# Patient Record
Sex: MALE | Race: WHITE | NOT HISPANIC OR LATINO | URBAN - METROPOLITAN AREA
[De-identification: names, ages, dates, MRNs, and addresses within clinical notes are randomized per-mention and may not be internally consistent; named-entity substitution may affect disease eponyms.]

---

## 2017-02-03 ENCOUNTER — APPOINTMENT (OUTPATIENT)
Dept: URBAN - METROPOLITAN AREA CLINIC 200 | Age: 57
Setting detail: DERMATOLOGY
End: 2017-02-03

## 2017-02-03 DIAGNOSIS — L57.8 OTHER SKIN CHANGES DUE TO CHRONIC EXPOSURE TO NONIONIZING RADIATION: ICD-10-CM

## 2017-02-03 DIAGNOSIS — L21.8 OTHER SEBORRHEIC DERMATITIS: ICD-10-CM

## 2017-02-03 DIAGNOSIS — L57.0 ACTINIC KERATOSIS: ICD-10-CM

## 2017-02-03 DIAGNOSIS — B35.3 TINEA PEDIS: ICD-10-CM

## 2017-02-03 PROBLEM — Z85.828 PERSONAL HISTORY OF OTHER MALIGNANT NEOPLASM OF SKIN: Status: ACTIVE | Noted: 2017-02-03

## 2017-02-03 PROCEDURE — 99213 OFFICE O/P EST LOW 20 MIN: CPT | Mod: 25

## 2017-02-03 PROCEDURE — OTHER PRESCRIPTION: OTHER

## 2017-02-03 PROCEDURE — OTHER COUNSELING: OTHER

## 2017-02-03 PROCEDURE — OTHER LIQUID NITROGEN: OTHER

## 2017-02-03 PROCEDURE — 17003 DESTRUCT PREMALG LES 2-14: CPT

## 2017-02-03 PROCEDURE — 17000 DESTRUCT PREMALG LESION: CPT

## 2017-02-03 RX ORDER — ALCLOMETASONE DIPROPIONATE 0.5 MG/G
CREAM TOPICAL
Qty: 1 | Refills: 1 | Status: ERX

## 2017-02-03 RX ORDER — OXICONAZOLE NITRATE 10 MG/G
CREAM TOPICAL
Qty: 1 | Refills: 1 | Status: ERX

## 2017-02-03 ASSESSMENT — LOCATION DETAILED DESCRIPTION DERM
LOCATION DETAILED: RIGHT SUPERIOR FOREHEAD
LOCATION DETAILED: RIGHT CENTRAL TEMPLE
LOCATION DETAILED: LEFT INFERIOR TEMPLE
LOCATION DETAILED: RIGHT CAVUM CONCHA
LOCATION DETAILED: RIGHT SUPERIOR LATERAL FOREHEAD
LOCATION DETAILED: RIGHT CRUS OF HELIX
LOCATION DETAILED: LEFT SUPERIOR MEDIAL UPPER BACK
LOCATION DETAILED: RIGHT DORSAL 4TH TOE
LOCATION DETAILED: LEFT LATERAL MALAR CHEEK
LOCATION DETAILED: LEFT LATERAL FOREHEAD

## 2017-02-03 ASSESSMENT — LOCATION SIMPLE DESCRIPTION DERM
LOCATION SIMPLE: LEFT FOREHEAD
LOCATION SIMPLE: RIGHT TEMPLE
LOCATION SIMPLE: LEFT CHEEK
LOCATION SIMPLE: LEFT TEMPLE
LOCATION SIMPLE: RIGHT FOREHEAD
LOCATION SIMPLE: RIGHT EAR
LOCATION SIMPLE: LEFT UPPER BACK
LOCATION SIMPLE: RIGHT 4TH TOE

## 2017-02-03 ASSESSMENT — LOCATION ZONE DERM
LOCATION ZONE: EAR
LOCATION ZONE: FACE
LOCATION ZONE: TOE
LOCATION ZONE: TRUNK

## 2017-02-03 NOTE — PROCEDURE: LIQUID NITROGEN
Consent: The patient's consent was obtained including but not limited to risks of crusting, scabbing, blistering, scarring, darker or lighter pigmentary change, recurrence, incomplete removal and infection.
Post-Care Instructions: I reviewed with the patient in detail post-care instructions. Patient is to wear sunprotection, and avoid picking at any of the treated lesions. Pt may apply Vaseline to crusted or scabbing areas.
Render Post-Care Instructions In Note?: no
Detail Level: Detailed
Number Of Freeze-Thaw Cycles: 2 freeze-thaw cycles
Duration Of Freeze Thaw-Cycle (Seconds): 10

## 2017-02-23 ENCOUNTER — APPOINTMENT (OUTPATIENT)
Dept: URBAN - METROPOLITAN AREA CLINIC 200 | Age: 57
Setting detail: DERMATOLOGY
End: 2017-03-01

## 2017-02-23 DIAGNOSIS — L57.0 ACTINIC KERATOSIS: ICD-10-CM

## 2017-02-23 PROCEDURE — OTHER DEFER: OTHER

## 2017-02-23 PROCEDURE — 99212 OFFICE O/P EST SF 10 MIN: CPT

## 2017-02-23 ASSESSMENT — LOCATION SIMPLE DESCRIPTION DERM: LOCATION SIMPLE: LEFT CHEEK

## 2017-02-23 ASSESSMENT — LOCATION DETAILED DESCRIPTION DERM: LOCATION DETAILED: LEFT CENTRAL MALAR CHEEK

## 2017-02-23 ASSESSMENT — LOCATION ZONE DERM: LOCATION ZONE: FACE

## 2017-02-24 ENCOUNTER — RX ONLY (RX ONLY)
Age: 57
End: 2017-02-24

## 2017-02-24 ENCOUNTER — APPOINTMENT (OUTPATIENT)
Dept: URBAN - METROPOLITAN AREA CLINIC 200 | Age: 57
Setting detail: DERMATOLOGY
End: 2017-03-01

## 2017-02-24 DIAGNOSIS — L57.0 ACTINIC KERATOSIS: ICD-10-CM

## 2017-02-24 PROCEDURE — 96567 PDT DSTR PRMLG LES SKN: CPT

## 2017-02-24 PROCEDURE — OTHER PDT: BLUE: OTHER

## 2017-02-24 PROCEDURE — OTHER PRESCRIPTION: OTHER

## 2017-02-24 RX ORDER — OXYCODONE HYDROCHLORIDE AND ACETAMINOPHEN 5; 325 MG/1; MG/1
TABLET ORAL
Qty: 16 | Refills: 0

## 2017-02-24 RX ORDER — ACETAMINOPHEN AND CODEINE PHOSPHATE 300; 30 MG/1; MG/1
TABLET ORAL
Qty: 20 | Refills: 0

## 2017-02-24 ASSESSMENT — LOCATION SIMPLE DESCRIPTION DERM
LOCATION SIMPLE: RIGHT FOREHEAD
LOCATION SIMPLE: LEFT CHEEK

## 2017-02-24 ASSESSMENT — LOCATION DETAILED DESCRIPTION DERM
LOCATION DETAILED: LEFT CENTRAL MALAR CHEEK
LOCATION DETAILED: RIGHT MEDIAL FOREHEAD

## 2017-02-24 ASSESSMENT — LOCATION ZONE DERM: LOCATION ZONE: FACE

## 2017-12-04 ENCOUNTER — RX ONLY (RX ONLY)
Age: 57
End: 2017-12-04

## 2017-12-04 RX ORDER — ECONAZOLE NITRATE 10 MG/G
CREAM TOPICAL
Qty: 1 | Refills: 3 | Status: ERX

## 2017-12-05 ENCOUNTER — RX ONLY (RX ONLY)
Age: 57
End: 2017-12-05

## 2017-12-05 RX ORDER — METHYLPREDNISOLONE 4 MG/1
TABLET ORAL
Qty: 1 | Refills: 0 | Status: ERX

## 2017-12-20 ENCOUNTER — RX ONLY (RX ONLY)
Age: 57
End: 2017-12-20

## 2017-12-20 ENCOUNTER — APPOINTMENT (OUTPATIENT)
Dept: URBAN - METROPOLITAN AREA CLINIC 200 | Age: 57
Setting detail: DERMATOLOGY
End: 2017-12-20

## 2017-12-20 DIAGNOSIS — L57.0 ACTINIC KERATOSIS: ICD-10-CM

## 2017-12-20 DIAGNOSIS — B35.3 TINEA PEDIS: ICD-10-CM

## 2017-12-20 DIAGNOSIS — L21.8 OTHER SEBORRHEIC DERMATITIS: ICD-10-CM

## 2017-12-20 DIAGNOSIS — L57.8 OTHER SKIN CHANGES DUE TO CHRONIC EXPOSURE TO NONIONIZING RADIATION: ICD-10-CM

## 2017-12-20 PROCEDURE — 99213 OFFICE O/P EST LOW 20 MIN: CPT | Mod: 25

## 2017-12-20 PROCEDURE — 17003 DESTRUCT PREMALG LES 2-14: CPT

## 2017-12-20 PROCEDURE — OTHER PRESCRIPTION: OTHER

## 2017-12-20 PROCEDURE — OTHER LIQUID NITROGEN: OTHER

## 2017-12-20 PROCEDURE — 17000 DESTRUCT PREMALG LESION: CPT

## 2017-12-20 PROCEDURE — OTHER COUNSELING: OTHER

## 2017-12-20 PROCEDURE — OTHER MIPS QUALITY: OTHER

## 2017-12-20 RX ORDER — OXICONAZOLE NITRATE 10 MG/G
CREAM TOPICAL
Qty: 1 | Refills: 6 | Status: ERX

## 2017-12-20 RX ORDER — NYSTATIN 100000 [USP'U]/G
CREAM TOPICAL
Qty: 1 | Refills: 4 | Status: ERX

## 2017-12-20 ASSESSMENT — LOCATION DETAILED DESCRIPTION DERM
LOCATION DETAILED: RIGHT SUPERIOR FOREHEAD
LOCATION DETAILED: RIGHT SUPERIOR MEDIAL UPPER BACK
LOCATION DETAILED: RIGHT INFERIOR FOREHEAD
LOCATION DETAILED: LEFT HEEL
LOCATION DETAILED: LEFT INFERIOR LATERAL FOREHEAD
LOCATION DETAILED: LEFT SUPERIOR FOREHEAD

## 2017-12-20 ASSESSMENT — LOCATION SIMPLE DESCRIPTION DERM
LOCATION SIMPLE: RIGHT UPPER BACK
LOCATION SIMPLE: RIGHT FOREHEAD
LOCATION SIMPLE: LEFT HEEL
LOCATION SIMPLE: LEFT FOREHEAD

## 2017-12-20 ASSESSMENT — PAIN INTENSITY VAS: HOW INTENSE IS YOUR PAIN 0 BEING NO PAIN, 10 BEING THE MOST SEVERE PAIN POSSIBLE?: NO PAIN

## 2017-12-20 ASSESSMENT — SEVERITY ASSESSMENT
HOW SEVERE IS THIS PATIENT'S CONDITION?: MILD
SEVERITY: MODERATE

## 2017-12-20 ASSESSMENT — LOCATION ZONE DERM
LOCATION ZONE: FACE
LOCATION ZONE: FEET
LOCATION ZONE: TRUNK

## 2017-12-20 NOTE — PROCEDURE: MIPS QUALITY
Quality 110: Preventive Care And Screening: Influenza Immunization: Influenza Immunization Administered during Influenza season
Detail Level: Generalized

## 2017-12-20 NOTE — PROCEDURE: LIQUID NITROGEN

## 2018-06-18 ENCOUNTER — APPOINTMENT (OUTPATIENT)
Dept: URBAN - METROPOLITAN AREA CLINIC 200 | Age: 58
Setting detail: DERMATOLOGY
End: 2018-06-21

## 2018-06-18 ENCOUNTER — RX ONLY (RX ONLY)
Age: 58
End: 2018-06-18

## 2018-06-18 DIAGNOSIS — L57.0 ACTINIC KERATOSIS: ICD-10-CM

## 2018-06-18 DIAGNOSIS — L21.8 OTHER SEBORRHEIC DERMATITIS: ICD-10-CM

## 2018-06-18 DIAGNOSIS — L57.8 OTHER SKIN CHANGES DUE TO CHRONIC EXPOSURE TO NONIONIZING RADIATION: ICD-10-CM

## 2018-06-18 DIAGNOSIS — B35.3 TINEA PEDIS: ICD-10-CM

## 2018-06-18 PROBLEM — Z85.828 PERSONAL HISTORY OF OTHER MALIGNANT NEOPLASM OF SKIN: Status: ACTIVE | Noted: 2018-06-18

## 2018-06-18 PROCEDURE — 17000 DESTRUCT PREMALG LESION: CPT

## 2018-06-18 PROCEDURE — 17003 DESTRUCT PREMALG LES 2-14: CPT

## 2018-06-18 PROCEDURE — OTHER PRESCRIPTION: OTHER

## 2018-06-18 PROCEDURE — OTHER PRESCRIPTION MEDICATION MANAGEMENT: OTHER

## 2018-06-18 PROCEDURE — 99213 OFFICE O/P EST LOW 20 MIN: CPT | Mod: 25

## 2018-06-18 PROCEDURE — OTHER COUNSELING: OTHER

## 2018-06-18 PROCEDURE — OTHER LIQUID NITROGEN: OTHER

## 2018-06-18 RX ORDER — OXICONAZOLE NITRATE 10 MG/G
CREAM TOPICAL
Qty: 1 | Refills: 6 | Status: ERX

## 2018-06-18 RX ORDER — NYSTATIN AND TRIAMCINOLONE ACETONIDE 100000; 1 MG/G; MG/G
CREAM TOPICAL
Qty: 1 | Refills: 3 | Status: ERX

## 2018-06-18 ASSESSMENT — LOCATION DETAILED DESCRIPTION DERM
LOCATION DETAILED: RIGHT MEDIAL TEMPLE
LOCATION DETAILED: LEFT MEDIAL SUPERIOR CHEST
LOCATION DETAILED: RIGHT INFERIOR CENTRAL MALAR CHEEK
LOCATION DETAILED: LEFT SUPERIOR FOREHEAD
LOCATION DETAILED: RIGHT INFERIOR MEDIAL FOREHEAD

## 2018-06-18 ASSESSMENT — LOCATION SIMPLE DESCRIPTION DERM
LOCATION SIMPLE: CHEST
LOCATION SIMPLE: RIGHT FOREHEAD
LOCATION SIMPLE: RIGHT CHEEK
LOCATION SIMPLE: LEFT FOREHEAD
LOCATION SIMPLE: RIGHT TEMPLE

## 2018-06-18 ASSESSMENT — LOCATION ZONE DERM
LOCATION ZONE: FACE
LOCATION ZONE: TRUNK

## 2018-06-18 ASSESSMENT — PAIN INTENSITY VAS: HOW INTENSE IS YOUR PAIN 0 BEING NO PAIN, 10 BEING THE MOST SEVERE PAIN POSSIBLE?: NO PAIN

## 2018-07-24 ENCOUNTER — RX ONLY (RX ONLY)
Age: 58
End: 2018-07-24

## 2018-07-24 RX ORDER — BETAMETHASONE DIPROPIONATE 0.5 MG/G
CREAM TOPICAL
Qty: 1 | Refills: 3 | Status: ERX | COMMUNITY
Start: 2018-07-24

## 2018-10-10 ENCOUNTER — APPOINTMENT (OUTPATIENT)
Dept: URBAN - METROPOLITAN AREA CLINIC 200 | Age: 58
Setting detail: DERMATOLOGY
End: 2018-10-15

## 2018-10-10 DIAGNOSIS — D485 NEOPLASM OF UNCERTAIN BEHAVIOR OF SKIN: ICD-10-CM

## 2018-10-10 PROBLEM — D48.5 NEOPLASM OF UNCERTAIN BEHAVIOR OF SKIN: Status: ACTIVE | Noted: 2018-10-10

## 2018-10-10 PROCEDURE — OTHER BIOPSY BY SHAVE METHOD: OTHER

## 2018-10-10 PROCEDURE — OTHER MIPS QUALITY: OTHER

## 2018-10-10 PROCEDURE — 11100: CPT

## 2018-10-10 ASSESSMENT — LOCATION SIMPLE DESCRIPTION DERM: LOCATION SIMPLE: NOSE

## 2018-10-10 ASSESSMENT — LOCATION DETAILED DESCRIPTION DERM: LOCATION DETAILED: NASAL ROOT

## 2018-10-10 ASSESSMENT — LOCATION ZONE DERM: LOCATION ZONE: NOSE

## 2018-10-10 NOTE — PROCEDURE: MIPS QUALITY
Detail Level: Detailed
Additional Notes: Patient has not received flu shot, but plans to.
Quality 110: Preventive Care And Screening: Influenza Immunization: Influenza Immunization not Administered for Documented Reasons.

## 2018-11-05 ENCOUNTER — APPOINTMENT (OUTPATIENT)
Dept: URBAN - METROPOLITAN AREA CLINIC 200 | Age: 58
Setting detail: DERMATOLOGY
End: 2018-11-08

## 2018-11-05 DIAGNOSIS — L57.0 ACTINIC KERATOSIS: ICD-10-CM

## 2018-11-05 PROCEDURE — OTHER LIQUID NITROGEN: OTHER

## 2018-11-05 PROCEDURE — 99213 OFFICE O/P EST LOW 20 MIN: CPT | Mod: 25

## 2018-11-05 PROCEDURE — 17000 DESTRUCT PREMALG LESION: CPT

## 2018-11-05 ASSESSMENT — LOCATION SIMPLE DESCRIPTION DERM: LOCATION SIMPLE: NOSE

## 2018-11-05 ASSESSMENT — LOCATION DETAILED DESCRIPTION DERM: LOCATION DETAILED: NASAL ROOT

## 2018-11-05 ASSESSMENT — LOCATION ZONE DERM: LOCATION ZONE: NOSE

## 2018-11-05 NOTE — PROCEDURE: LIQUID NITROGEN
Detail Level: Detailed
Consent: The patient's consent was obtained including but not limited to risks of crusting, scabbing, blistering, scarring, darker or lighter pigmentary change, recurrence, incomplete removal and infection.
Post-Care Instructions: I reviewed with the patient in detail post-care instructions. Patient is to wear sunprotection, and avoid picking at any of the treated lesions. Pt may apply Vaseline to crusted or scabbing areas.
Duration Of Freeze Thaw-Cycle (Seconds): 2
Render Post-Care Instructions In Note?: no
Skin normal color for race, warm, dry and intact. No evidence of rash.

## 2019-06-20 ENCOUNTER — APPOINTMENT (OUTPATIENT)
Dept: URBAN - METROPOLITAN AREA CLINIC 200 | Age: 59
Setting detail: DERMATOLOGY
End: 2019-06-23

## 2019-06-20 DIAGNOSIS — L57.8 OTHER SKIN CHANGES DUE TO CHRONIC EXPOSURE TO NONIONIZING RADIATION: ICD-10-CM

## 2019-06-20 DIAGNOSIS — L20.89 OTHER ATOPIC DERMATITIS: ICD-10-CM

## 2019-06-20 DIAGNOSIS — B35.3 TINEA PEDIS: ICD-10-CM

## 2019-06-20 DIAGNOSIS — L20.84 INTRINSIC (ALLERGIC) ECZEMA: ICD-10-CM

## 2019-06-20 DIAGNOSIS — L21.8 OTHER SEBORRHEIC DERMATITIS: ICD-10-CM

## 2019-06-20 PROCEDURE — OTHER PRESCRIPTION: OTHER

## 2019-06-20 PROCEDURE — OTHER COUNSELING: OTHER

## 2019-06-20 PROCEDURE — OTHER PRESCRIPTION SAMPLES PROVIDED: OTHER

## 2019-06-20 PROCEDURE — 99213 OFFICE O/P EST LOW 20 MIN: CPT

## 2019-06-20 RX ORDER — SULCONAZOLE NITRATE 10 MG/G
CREAM TOPICAL
Qty: 1 | Refills: 3 | Status: ERX | COMMUNITY
Start: 2019-06-20

## 2019-06-20 ASSESSMENT — LOCATION DETAILED DESCRIPTION DERM
LOCATION DETAILED: LEFT MEDIAL SUPERIOR CHEST
LOCATION DETAILED: LEFT MEDIAL PLANTAR MIDFOOT
LOCATION DETAILED: RIGHT CYMBA CONCHA

## 2019-06-20 ASSESSMENT — SEVERITY ASSESSMENT
SEVERITY: MILD TO MODERATE
HOW SEVERE IS THIS PATIENT'S CONDITION?: MODERATE

## 2019-06-20 ASSESSMENT — LOCATION ZONE DERM
LOCATION ZONE: EAR
LOCATION ZONE: FEET
LOCATION ZONE: TRUNK

## 2019-06-20 ASSESSMENT — LOCATION SIMPLE DESCRIPTION DERM
LOCATION SIMPLE: CHEST
LOCATION SIMPLE: LEFT PLANTAR SURFACE
LOCATION SIMPLE: RIGHT EAR

## 2019-10-29 ENCOUNTER — RX ONLY (RX ONLY)
Age: 59
End: 2019-10-29

## 2019-10-29 RX ORDER — TRIAMCINOLONE ACETONIDE OINTMENT 0.5 MG/G
OINTMENT TOPICAL
Qty: 1 | Refills: 0 | Status: ERX | COMMUNITY
Start: 2019-10-29

## 2020-11-03 ENCOUNTER — APPOINTMENT (OUTPATIENT)
Dept: URBAN - METROPOLITAN AREA CLINIC 200 | Age: 60
Setting detail: DERMATOLOGY
End: 2020-11-06

## 2020-11-03 DIAGNOSIS — L57.8 OTHER SKIN CHANGES DUE TO CHRONIC EXPOSURE TO NONIONIZING RADIATION: ICD-10-CM

## 2020-11-03 DIAGNOSIS — B07.8 OTHER VIRAL WARTS: ICD-10-CM

## 2020-11-03 DIAGNOSIS — L91.8 OTHER HYPERTROPHIC DISORDERS OF THE SKIN: ICD-10-CM

## 2020-11-03 DIAGNOSIS — Z85.828 PERSONAL HISTORY OF OTHER MALIGNANT NEOPLASM OF SKIN: ICD-10-CM

## 2020-11-03 DIAGNOSIS — L57.0 ACTINIC KERATOSIS: ICD-10-CM

## 2020-11-03 PROCEDURE — 99213 OFFICE O/P EST LOW 20 MIN: CPT | Mod: 25

## 2020-11-03 PROCEDURE — 17003 DESTRUCT PREMALG LES 2-14: CPT | Mod: 59

## 2020-11-03 PROCEDURE — OTHER COUNSELING: OTHER

## 2020-11-03 PROCEDURE — OTHER REASSURANCE: OTHER

## 2020-11-03 PROCEDURE — 17000 DESTRUCT PREMALG LESION: CPT | Mod: 59

## 2020-11-03 PROCEDURE — 17110 DESTRUCT B9 LESION 1-14: CPT

## 2020-11-03 PROCEDURE — OTHER LIQUID NITROGEN: OTHER

## 2020-11-03 ASSESSMENT — LOCATION SIMPLE DESCRIPTION DERM
LOCATION SIMPLE: LEFT ANTERIOR NECK
LOCATION SIMPLE: RIGHT CHEEK
LOCATION SIMPLE: LEFT THIGH
LOCATION SIMPLE: CHEST
LOCATION SIMPLE: LEFT AXILLARY VAULT
LOCATION SIMPLE: LEFT EYEBROW

## 2020-11-03 ASSESSMENT — LOCATION ZONE DERM
LOCATION ZONE: FACE
LOCATION ZONE: AXILLAE
LOCATION ZONE: TRUNK
LOCATION ZONE: NECK
LOCATION ZONE: LEG

## 2020-11-03 ASSESSMENT — LOCATION DETAILED DESCRIPTION DERM
LOCATION DETAILED: LEFT AXILLARY VAULT
LOCATION DETAILED: RIGHT CENTRAL MALAR CHEEK
LOCATION DETAILED: LEFT ANTERIOR PROXIMAL THIGH
LOCATION DETAILED: LEFT INFERIOR ANTERIOR NECK
LOCATION DETAILED: LEFT MEDIAL SUPERIOR CHEST
LOCATION DETAILED: LEFT LATERAL EYEBROW

## 2020-11-03 NOTE — PROCEDURE: LIQUID NITROGEN
Medical Necessity Clause: This procedure was medically necessary because the lesions that were treated were: causing pain
Duration Of Freeze Thaw-Cycle (Seconds): 2
Detail Level: Detailed
Include Z78.9 (Other Specified Conditions Influencing Health Status) As An Associated Diagnosis?: Yes
Consent: The patient's consent was obtained including but not limited to risks of crusting, scabbing, blistering, scarring, darker or lighter pigmentary change, recurrence, incomplete removal and infection.
Render Post-Care Instructions In Note?: no
Post-Care Instructions: I reviewed with the patient in detail post-care instructions. Patient is to wear sunprotection, and avoid picking at any of the treated lesions. Pt may apply Vaseline to crusted or scabbing areas.
Medical Necessity Information: It is in your best interest to select a reason for this procedure from the list below. All of these items fulfill various CMS LCD requirements except the new and changing color options.
Number Of Freeze-Thaw Cycles: 2 freeze-thaw cycles
Number Of Freeze-Thaw Cycles: 1 freeze-thaw cycle

## 2020-11-03 NOTE — PROCEDURE: REASSURANCE
Include Location In Plan?: Yes
Additional Note: Cryo
Hide Include Location In Plan Question?: No
Detail Level: Zone

## 2020-11-03 NOTE — HPI: EVALUATION OF SKIN LESION(S)
12/8/2018    Call Regarding [facility]ogram    Attempt 1    Message on voicemail    Patient is also due for:     Outreach   SV     Hpi Title: Evaluation of Skin Lesions

## 2020-11-04 NOTE — PROCEDURE: BIOPSY BY SHAVE METHOD
Dressing: bandage
Notification Instructions: Patient will be notified of biopsy results. However, patient instructed to call the office if not contacted within 2 weeks.
Biopsy Method: Dermablade
Post-Care Instructions: I reviewed with the patient in detail post-care instructions. Patient is to keep the biopsy site dry overnight, and then apply bacitracin twice daily until healed. Patient may apply hydrogen peroxide soaks to remove any crusting.
Type Of Destruction Used: Curettage
Render Post-Care Instructions In Note?: no
Curettage Text: The wound bed was treated with curettage after the biopsy was performed.
Hemostasis: Drysol
Wound Care: Petrolatum
X Size Of Lesion In Cm: 0
Biopsy Type: H and E
Electrodesiccation Text: The wound bed was treated with electrodesiccation after the biopsy was performed.
Cryotherapy Text: The wound bed was treated with cryotherapy after the biopsy was performed.
Was A Bandage Applied: Yes
Detail Level: Detailed
Electrodesiccation And Curettage Text: The wound bed was treated with electrodesiccation and curettage after the biopsy was performed.
Billing Type: Third-Party Bill
Anesthesia Volume In Cc (Will Not Render If 0): 0.5
Depth Of Biopsy: dermis
Silver Nitrate Text: The wound bed was treated with silver nitrate after the biopsy was performed.
Anesthesia Type: 0.5% lidocaine without epinephrine
Consent: Written consent was obtained and risks were reviewed including but not limited to scarring, infection, bleeding, scabbing, incomplete removal, nerve damage and allergy to anesthesia.
Posterior Auricular Interpolation Flap Text: A decision was made to reconstruct the defect utilizing an interpolation axial flap and a staged reconstruction.  A telfa template was made of the defect.  This telfa template was then used to outline the posterior auricular interpolation flap.  The donor area for the pedicle flap was then injected with anesthesia.  The flap was excised through the skin and subcutaneous tissue down to the layer of the underlying musculature.  The pedicle flap was carefully excised within this deep plane to maintain its blood supply.  The edges of the donor site were undermined.   The donor site was closed in a primary fashion.  The pedicle was then rotated into position and sutured.  Once the tube was sutured into place, adequate blood supply was confirmed with blanching and refill.  The pedicle was then wrapped with xeroform gauze and dressed appropriately with a telfa and gauze bandage to ensure continued blood supply and protect the attached pedicle.

## 2022-03-11 ENCOUNTER — HOSPITAL ENCOUNTER (OUTPATIENT)
Facility: CLINIC | Age: 62
Discharge: HOME | End: 2022-03-11
Attending: FAMILY MEDICINE
Payer: COMMERCIAL

## 2022-03-11 VITALS
DIASTOLIC BLOOD PRESSURE: 74 MMHG | SYSTOLIC BLOOD PRESSURE: 120 MMHG | RESPIRATION RATE: 20 BRPM | OXYGEN SATURATION: 94 % | HEART RATE: 75 BPM | TEMPERATURE: 98.9 F

## 2022-03-11 DIAGNOSIS — K52.9 GASTROENTERITIS, ACUTE: Primary | ICD-10-CM

## 2022-03-11 PROCEDURE — 99202 OFFICE O/P NEW SF 15 MIN: CPT | Performed by: FAMILY MEDICINE

## 2022-03-11 PROCEDURE — 80053 COMPREHEN METABOLIC PANEL: CPT | Performed by: FAMILY MEDICINE

## 2022-03-11 PROCEDURE — 85025 COMPLETE CBC W/AUTO DIFF WBC: CPT | Performed by: FAMILY MEDICINE

## 2022-03-11 ASSESSMENT — ENCOUNTER SYMPTOMS
BLOOD IN STOOL: 1
ABDOMINAL PAIN: 0
DIARRHEA: 1
FEVER: 0
ABDOMINAL DISTENTION: 0
DIZZINESS: 0
VOMITING: 1
FATIGUE: 0
RECTAL PAIN: 0
CHILLS: 0
NAUSEA: 1
CONSTIPATION: 0
SHORTNESS OF BREATH: 0
ANAL BLEEDING: 1

## 2022-03-12 ENCOUNTER — HOSPITAL ENCOUNTER (OUTPATIENT)
Facility: CLINIC | Age: 62
Discharge: HOME | End: 2022-03-12
Attending: FAMILY MEDICINE
Payer: COMMERCIAL

## 2022-03-12 ENCOUNTER — APPOINTMENT (EMERGENCY)
Dept: RADIOLOGY | Facility: HOSPITAL | Age: 62
End: 2022-03-12
Payer: COMMERCIAL

## 2022-03-12 ENCOUNTER — HOSPITAL ENCOUNTER (EMERGENCY)
Facility: HOSPITAL | Age: 62
Discharge: HOME | End: 2022-03-12
Attending: EMERGENCY MEDICINE
Payer: COMMERCIAL

## 2022-03-12 VITALS
WEIGHT: 215 LBS | HEIGHT: 71 IN | RESPIRATION RATE: 18 BRPM | SYSTOLIC BLOOD PRESSURE: 126 MMHG | OXYGEN SATURATION: 95 % | DIASTOLIC BLOOD PRESSURE: 64 MMHG | HEART RATE: 97 BPM | TEMPERATURE: 97.1 F | BODY MASS INDEX: 30.1 KG/M2

## 2022-03-12 VITALS
DIASTOLIC BLOOD PRESSURE: 65 MMHG | OXYGEN SATURATION: 96 % | TEMPERATURE: 98.5 F | SYSTOLIC BLOOD PRESSURE: 130 MMHG | HEART RATE: 57 BPM | RESPIRATION RATE: 18 BRPM

## 2022-03-12 DIAGNOSIS — L25.9 CONTACT DERMATITIS, UNSPECIFIED CONTACT DERMATITIS TYPE, UNSPECIFIED TRIGGER: Primary | ICD-10-CM

## 2022-03-12 DIAGNOSIS — R21 RASH: ICD-10-CM

## 2022-03-12 DIAGNOSIS — K76.0 HEPATIC STEATOSIS: Primary | ICD-10-CM

## 2022-03-12 PROBLEM — H93.13 BILATERAL TINNITUS: Status: ACTIVE | Noted: 2022-03-12

## 2022-03-12 PROBLEM — H90.2 CONDUCTIVE HEARING LOSS: Status: ACTIVE | Noted: 2022-03-12

## 2022-03-12 LAB
ALBUMIN SERPL-MCNC: 4.1 G/DL (ref 3.4–5)
ALBUMIN SERPL-MCNC: 4.1 G/DL (ref 3.4–5)
ALP SERPL-CCNC: 49 IU/L (ref 35–126)
ALP SERPL-CCNC: 64 IU/L (ref 35–126)
ALT SERPL-CCNC: 105 IU/L (ref 16–63)
ALT SERPL-CCNC: 147 IU/L (ref 16–63)
ANION GAP SERPL CALC-SCNC: 11 MEQ/L (ref 3–15)
ANION GAP SERPL CALC-SCNC: 6 MEQ/L (ref 3–15)
APAP SERPL-MCNC: <10 UG/ML (ref 10–30)
APTT PPP: 31 SEC (ref 23–35)
AST SERPL-CCNC: 45 IU/L (ref 15–41)
AST SERPL-CCNC: 81 IU/L (ref 15–41)
BACTERIA URNS QL MICRO: ABNORMAL /HPF
BASOPHILS # BLD: 0.02 K/UL (ref 0.01–0.1)
BASOPHILS # BLD: 0.05 K/UL (ref 0.01–0.1)
BASOPHILS NFR BLD: 0.3 %
BASOPHILS NFR BLD: 0.5 %
BILIRUB SERPL-MCNC: 1 MG/DL (ref 0.3–1.2)
BILIRUB SERPL-MCNC: 1 MG/DL (ref 0.3–1.2)
BILIRUB UR QL STRIP.AUTO: NEGATIVE MG/DL
BUN SERPL-MCNC: 16 MG/DL (ref 8–20)
BUN SERPL-MCNC: 20 MG/DL (ref 8–20)
CALCIUM SERPL-MCNC: 8.8 MG/DL (ref 8.9–10.3)
CALCIUM SERPL-MCNC: 9.3 MG/DL (ref 8.9–10.3)
CHLORIDE SERPL-SCNC: 104 MEQ/L (ref 98–109)
CHLORIDE SERPL-SCNC: 105 MEQ/L (ref 98–109)
CLARITY UR REFRACT.AUTO: CLEAR
CO2 SERPL-SCNC: 26 MEQ/L (ref 22–32)
CO2 SERPL-SCNC: 26 MEQ/L (ref 22–32)
COLOR UR AUTO: YELLOW
CREAT SERPL-MCNC: 0.9 MG/DL (ref 0.8–1.3)
CREAT SERPL-MCNC: 1.1 MG/DL (ref 0.8–1.3)
DIFFERENTIAL METHOD BLD: ABNORMAL
DIFFERENTIAL METHOD BLD: NORMAL
EOSINOPHIL # BLD: 0.07 K/UL (ref 0.04–0.54)
EOSINOPHIL # BLD: 0.08 K/UL (ref 0.04–0.54)
EOSINOPHIL NFR BLD: 0.6 %
EOSINOPHIL NFR BLD: 1.1 %
ERYTHROCYTE [DISTWIDTH] IN BLOOD BY AUTOMATED COUNT: 14.1 % (ref 11.6–14.4)
ERYTHROCYTE [DISTWIDTH] IN BLOOD BY AUTOMATED COUNT: 14.9 % (ref 11.6–14.4)
ETHANOL SERPL-MCNC: <5 MG/DL
GFR SERPL CREATININE-BSD FRML MDRD: >60 ML/MIN/1.73M*2
GFR SERPL CREATININE-BSD FRML MDRD: >60 ML/MIN/1.73M*2
GLUCOSE SERPL-MCNC: 100 MG/DL (ref 70–99)
GLUCOSE SERPL-MCNC: 102 MG/DL (ref 70–99)
GLUCOSE UR STRIP.AUTO-MCNC: NEGATIVE MG/DL
HCT VFR BLDCO AUTO: 44.8 % (ref 40.1–51)
HCT VFR BLDCO AUTO: 47.7 % (ref 40.1–51)
HGB BLD-MCNC: 14.6 G/DL (ref 13.7–17.5)
HGB BLD-MCNC: 15.5 G/DL (ref 13.7–17.5)
HGB UR QL STRIP.AUTO: NEGATIVE
HYALINE CASTS #/AREA URNS LPF: ABNORMAL /LPF
IMM GRANULOCYTES # BLD AUTO: 0.02 K/UL (ref 0–0.08)
IMM GRANULOCYTES # BLD AUTO: 0.02 K/UL (ref 0–0.08)
IMM GRANULOCYTES NFR BLD AUTO: 0.2 %
IMM GRANULOCYTES NFR BLD AUTO: 0.3 %
INR PPP: 1.1
KETONES UR STRIP.AUTO-MCNC: 1 MG/DL
LACTATE SERPL-SCNC: 1 MMOL/L (ref 0.4–2)
LEUKOCYTE ESTERASE UR QL STRIP.AUTO: NEGATIVE
LYMPHOCYTES # BLD: 1.31 K/UL (ref 1.2–3.5)
LYMPHOCYTES # BLD: 1.52 K/UL (ref 1.2–3.5)
LYMPHOCYTES NFR BLD: 11.9 %
LYMPHOCYTES NFR BLD: 20.6 %
MCH RBC QN AUTO: 31.1 PG (ref 28–33.2)
MCH RBC QN AUTO: 31.3 PG (ref 28–33.2)
MCHC RBC AUTO-ENTMCNC: 32.5 G/DL (ref 32.2–36.5)
MCHC RBC AUTO-ENTMCNC: 32.6 G/DL (ref 32.2–36.5)
MCV RBC AUTO: 95.3 FL (ref 83–98)
MCV RBC AUTO: 96.2 FL (ref 83–98)
MONOCYTES # BLD: 0.88 K/UL (ref 0.3–1)
MONOCYTES # BLD: 0.94 K/UL (ref 0.3–1)
MONOCYTES NFR BLD: 11.9 %
MONOCYTES NFR BLD: 8.5 %
MUCOUS THREADS URNS QL MICRO: 3 /LPF
NEUTROPHILS # BLD: 4.86 K/UL (ref 1.7–7)
NEUTROPHILS # BLD: 8.61 K/UL (ref 1.7–7)
NEUTS SEG NFR BLD: 65.8 %
NEUTS SEG NFR BLD: 78.3 %
NITRITE UR QL STRIP.AUTO: NEGATIVE
NRBC BLD-RTO: 0 %
NRBC BLD-RTO: 0 %
PDW BLD AUTO: 10.5 FL (ref 9.4–12.4)
PDW BLD AUTO: 11.4 FL (ref 9.4–12.4)
PH UR STRIP.AUTO: 6.5 [PH]
PLATELET # BLD AUTO: 152 K/UL (ref 150–350)
PLATELET # BLD AUTO: 170 K/UL (ref 150–350)
POTASSIUM SERPL-SCNC: 4.1 MEQ/L (ref 3.6–5.1)
POTASSIUM SERPL-SCNC: 4.3 MEQ/L (ref 3.6–5.1)
PROT SERPL-MCNC: 6.4 G/DL (ref 6–8.2)
PROT SERPL-MCNC: 6.8 G/DL (ref 6–8.2)
PROT UR QL STRIP.AUTO: 1
PROTHROMBIN TIME: 13.6 SEC (ref 12.2–14.5)
RBC # BLD AUTO: 4.7 M/UL (ref 4.5–5.8)
RBC # BLD AUTO: 4.96 M/UL (ref 4.5–5.8)
RBC #/AREA URNS HPF: ABNORMAL /HPF
SALICYLATES SERPL-MCNC: <4 MG/DL
SODIUM SERPL-SCNC: 136 MEQ/L (ref 136–144)
SODIUM SERPL-SCNC: 142 MEQ/L (ref 136–144)
SP GR UR REFRACT.AUTO: 1.03
SQUAMOUS URNS QL MICRO: ABNORMAL /HPF
UROBILINOGEN UR STRIP-ACNC: 0.2 EU/DL
WBC # BLD AUTO: 11 K/UL (ref 3.8–10.5)
WBC # BLD AUTO: 7.38 K/UL (ref 3.8–10.5)
WBC #/AREA URNS HPF: ABNORMAL /HPF

## 2022-03-12 PROCEDURE — 85610 PROTHROMBIN TIME: CPT | Performed by: EMERGENCY MEDICINE

## 2022-03-12 PROCEDURE — 85730 THROMBOPLASTIN TIME PARTIAL: CPT | Performed by: EMERGENCY MEDICINE

## 2022-03-12 PROCEDURE — 83605 ASSAY OF LACTIC ACID: CPT | Performed by: EMERGENCY MEDICINE

## 2022-03-12 PROCEDURE — 99213 OFFICE O/P EST LOW 20 MIN: CPT

## 2022-03-12 PROCEDURE — 81001 URINALYSIS AUTO W/SCOPE: CPT | Mod: 59

## 2022-03-12 PROCEDURE — 85025 COMPLETE CBC W/AUTO DIFF WBC: CPT

## 2022-03-12 PROCEDURE — G0480 DRUG TEST DEF 1-7 CLASSES: HCPCS

## 2022-03-12 PROCEDURE — 76705 ECHO EXAM OF ABDOMEN: CPT

## 2022-03-12 PROCEDURE — 80053 COMPREHEN METABOLIC PANEL: CPT

## 2022-03-12 PROCEDURE — 99284 EMERGENCY DEPT VISIT MOD MDM: CPT | Mod: 25

## 2022-03-12 PROCEDURE — 36415 COLL VENOUS BLD VENIPUNCTURE: CPT | Performed by: EMERGENCY MEDICINE

## 2022-03-12 RX ORDER — TRIAMCINOLONE ACETONIDE 1 MG/G
CREAM TOPICAL 2 TIMES DAILY
Qty: 15 G | Refills: 0 | Status: SHIPPED | OUTPATIENT
Start: 2022-03-12 | End: 2023-11-08 | Stop reason: ALTCHOICE

## 2022-03-12 ASSESSMENT — ENCOUNTER SYMPTOMS
COLOR CHANGE: 1
RHINORRHEA: 0
NAUSEA: 1
VOMITING: 1
COUGH: 1
HEMATURIA: 0
DIAPHORESIS: 1
SORE THROAT: 0
DYSURIA: 0
ABDOMINAL PAIN: 1
FEVER: 0
COLOR CHANGE: 0
FREQUENCY: 0
LIGHT-HEADEDNESS: 0
PALPITATIONS: 0
SHORTNESS OF BREATH: 0
NECK PAIN: 0
CONSTIPATION: 0
BACK PAIN: 0
CHILLS: 1
BLOOD IN STOOL: 1
DIARRHEA: 1
HEADACHES: 0

## 2022-03-12 NOTE — ED PROVIDER NOTES
Emergency Medicine Note  HPI   HISTORY OF PRESENT ILLNESS     63 yo male   Presents with a b/l forearm irritation that began after swimming and working out at the gym today  Denies itchiness or pain.  Denies new detergents or soaps  Was seen at this  yesterday, dx Gastroenteritis; states sxs fully improved.  .                  Patient History   PAST HISTORY     Reviewed from Nursing Triage:  Tobacco  Allergies  Meds  Problems  Med Hx  Surg Hx  Fam Hx  Soc   Hx      History reviewed. No pertinent past medical history.    History reviewed. No pertinent surgical history.    History reviewed. No pertinent family history.    Social History     Tobacco Use   • Smoking status: Not on file   • Smokeless tobacco: Not on file   Substance Use Topics   • Alcohol use: Not on file   • Drug use: Not on file         Review of Systems   REVIEW OF SYSTEMS     Review of Systems   Skin: Positive for color change and rash.   All other systems reviewed and are negative.        VITALS     ED Vitals    Date/Time Temp Pulse Resp BP SpO2 [facility]   03/12/22 1046 36.2 °C (97.1 °F) 97 18 126/64 95 % THERESA                       Physical Exam   PHYSICAL EXAM     Physical Exam  Vitals reviewed.   Constitutional:       Appearance: Normal appearance.   HENT:      Head: Normocephalic.   Cardiovascular:      Rate and Rhythm: Normal rate.      Pulses: Normal pulses.   Skin:     General: Skin is warm.      Findings: Erythema present.          Neurological:      Mental Status: He is alert.   Psychiatric:         Mood and Affect: Mood normal.         Behavior: Behavior normal.           PROCEDURES     Procedures     DATA     Results     None              No orders to display       Scoring tools                                 ED Course & MDM   MDM / ED COURSE / CLINICAL IMPRESSIONS / DISPO     MDM  Number of Diagnoses or Management Options  Contact dermatitis, unspecified contact dermatitis type, unspecified trigger: new and requires workup  Diagnosis  management comments:   Start with OTC cortisone cream for a few days  Prescription Triamcinolone given if no relief   Claritin / Pepcid may also be helpful  Avoid pool chemicals until rash is resolved  Follow up with PCP or dermatology if symptoms worsen / do not improve        Clinical Impressions as of 03/12/22 1126   Contact dermatitis, unspecified contact dermatitis type, unspecified trigger     Discharge         Benjamín Cole CRNP  03/12/22 1126

## 2022-03-12 NOTE — ED PROVIDER NOTES
"Emergency Medicine Note  HPI   HISTORY OF PRESENT ILLNESS       History provided by:  Patient and medical records   used: No      63 y/o M with no known PMH presents to the ED via private vehicle for evaluation of abnormal labs. 2 days ago, patient ate pizza and had a couple beers. Throughout that night, he had chills, sweats, nausea, vomiting, and diarrhea. He felt non-localized abdominal pain that felt like \"the pain you feel when you need to have a bowel movement.\" He noticed bright red blood in his stool and on the toilet paper when he wiped. This occurred throughout the night into the next morning. He went to urgent care yesterday and was diagnosed with gastroenteritis. He was encouraged to follow a clear liquid diet, which he has done. Patient has not had any episodes of vomiting, diarrhea, or abdominal pain since yesterday morning. Today, the patient noticed a red rash on his bilateral forearms after swimming and going to the gym. No pain, itching, swelling, or increased warmth. He went to urgent care again and was diagnosed with contact dermatitis. After leaving urgent care, patient was notified of abnormal lab results from his visit yesterday. His WBC was elevated to 11, AST was elevated to 81, and ALT to 147. Patient denies any abdominal pain at this time. He follows with his PCP annually and has not had any abnormalities prior. Denies tylenol intake. Drinks socially about 2-3 beers occasionally. No previous abdominal imaging or surgeries. He has also noticed a recent dry cough. Patient's wife notices redness to the patient's back. Denies fever, chest pain, shortness of breath, leg swelling, palpitations, back pain, urinary symptoms, lightheadedness, headache, syncope.      Patient History   PAST HISTORY     Reviewed from Nursing Triage:       History reviewed. No pertinent past medical history.    History reviewed. No pertinent surgical history.    History reviewed. No pertinent family " history.    Social History     Tobacco Use   • Smoking status: Never Smoker   • Smokeless tobacco: Never Used   Substance Use Topics   • Alcohol use: Yes     Comment: social   • Drug use: Never         Review of Systems   REVIEW OF SYSTEMS     Review of Systems   Constitutional: Positive for chills and diaphoresis. Negative for fever.   HENT: Negative for congestion, rhinorrhea and sore throat.    Respiratory: Positive for cough. Negative for shortness of breath.    Cardiovascular: Negative for chest pain, palpitations and leg swelling.   Gastrointestinal: Positive for abdominal pain, blood in stool, diarrhea, nausea and vomiting. Negative for constipation.   Genitourinary: Negative for dysuria, frequency and hematuria.   Musculoskeletal: Negative for back pain and neck pain.   Skin: Positive for rash. Negative for color change.   Neurological: Negative for syncope, light-headedness and headaches.         VITALS     ED Vitals    Date/Time Temp Pulse Resp BP SpO2 [facility]   03/12/22 2006 -- -- 18 130/65 -- JLG   03/12/22 2005 -- 57 18 130/65 96 % EEL   03/12/22 1815 36.9 °C (98.5 °F) 74 19 114/74 94 % MT        Pulse Ox %: 94 % (03/12/22 1816)  Pulse Ox Interpretation: Normal (03/12/22 1816)  Heart Rate: 74 (03/12/22 1816)        Physical Exam   PHYSICAL EXAM     Physical Exam  Vitals and nursing note reviewed. Exam conducted with a chaperone present.   Constitutional:       General: He is not in acute distress.     Appearance: Normal appearance. He is not toxic-appearing.   HENT:      Head: Normocephalic and atraumatic.      Nose: Nose normal.      Mouth/Throat:      Mouth: Mucous membranes are moist.      Pharynx: Oropharynx is clear.   Eyes:      General: No scleral icterus.     Extraocular Movements: Extraocular movements intact.      Conjunctiva/sclera: Conjunctivae normal.      Pupils: Pupils are equal, round, and reactive to light.   Cardiovascular:      Rate and Rhythm: Normal rate and regular rhythm.      Pulses:  Normal pulses.      Heart sounds: Normal heart sounds. No murmur heard.  Pulmonary:      Effort: Pulmonary effort is normal. No respiratory distress.      Breath sounds: Normal breath sounds. No wheezing, rhonchi or rales.   Abdominal:      General: Bowel sounds are normal. There is no distension.      Palpations: Abdomen is soft. There is no mass.      Tenderness: There is no abdominal tenderness. There is no right CVA tenderness, left CVA tenderness, guarding or rebound.      Hernia: No hernia is present.      Comments: No peritoneal signs   Genitourinary:     Rectum: Guaiac result positive (trace). No tenderness, anal fissure or external hemorrhoid.      Comments: No estrella blood.  Musculoskeletal:         General: Normal range of motion.      Cervical back: Normal range of motion and neck supple.      Right lower leg: No edema.      Left lower leg: No edema.   Skin:     General: Skin is warm and dry.      Capillary Refill: Capillary refill takes less than 2 seconds.      Comments: Bilateral erythematous rash on volar aspect of forearms extending from wrist to AC fossa. Circular erythematous rash to dorsal aspect of bilateral 1st metacarpals. Non-cellulitic. No increased warmth. No hives. Nontender.  Small nickel to quarter sized yellowish discolorations in 3 areas on abdomen, likely healing bruises.  Mild patchy erythema diffusely on back.   Neurological:      General: No focal deficit present.      Mental Status: He is alert and oriented to person, place, and time.      GCS: GCS eye subscore is 4. GCS verbal subscore is 5. GCS motor subscore is 6.      Sensory: Sensation is intact.      Gait: Gait is intact.      Comments: CN II-XII grossly intact   Psychiatric:         Mood and Affect: Mood normal.         Behavior: Behavior normal.           PROCEDURES     Procedures     DATA     Results     Procedure Component Value Units Date/Time    UA with reflex culture [[id number]]  (Abnormal) Collected: 03/12/22 2016     Specimen: Urine, Clean Catch Updated: 03/12/22 2032    Narrative:      The following orders were created for panel order UA with reflex culture.  Procedure                               Abnormality         Status                     ---------                               -----------         ------                     UA Reflex to Culture (Ma...[[id number]]  Abnormal            Final result               UA Microscopic[[id number]]               Abnormal            Final result                 Please view results for these tests on the individual orders.    UA Microscopic [[id number]]  (Abnormal) Collected: 03/12/22 2016    Specimen: Urine, Clean Catch Updated: 03/12/22 2032     RBC, Urine 0 TO 4 /HPF      WBC, Urine 4 TO 10 /HPF      Squamous Epithelial Rare /hpf      Hyaline Cast None Seen /lpf      Bacteria, Urine None Seen /HPF      MUCUSUA +3 /LPF     UA Reflex to Culture (Macroscopic) [[id number]]  (Abnormal) Collected: 03/12/22 2016    Specimen: Urine, Clean Catch Updated: 03/12/22 2029     Color, Urine Yellow     Clarity, Urine Clear     Specific Gravity, Urine 1.027     pH, Urine 6.5     Leukocyte Esterase Negative     Comment: Results can be falsely negative due to high specific gravity, some antibiotics, glucose >3 g/dl, or WBC other than neutrophils.        Nitrite, Urine Negative     Protein, Urine +1     Glucose, Urine Negative mg/dL      Ketones, Urine +1 mg/dL      Comment: Free sulfhydryl drugs such as Mesna, Capoten, and Acetylcysteine (Mucomyst) may cause false positive ketonuria.        Urobilinogen, Urine 0.2 EU/dL      Bilirubin, Urine Negative mg/dL      Blood, Urine Negative     Comment: The sensitivity of the occult blood test is equivalent to approximately 4 intact RBC/HPF.       Protime-INR [[id number]]  (Normal) Collected: 03/12/22 1915    Specimen: Blood, Venous Updated: 03/12/22 1954     PT 13.6 sec      INR 1.1     Comment: INR has no defined significance when PT is within Reference Range.        APTT [[id number]]  (Normal) Collected: 03/12/22 1915    Specimen: Blood, Venous Updated: 03/12/22 1954     PTT 31 sec     ER toxicology screen, serum [[id number]]  (Abnormal) Collected: 03/12/22 1916    Specimen: Blood, Venous Updated: 03/12/22 1948     Salicylate <4.0 mg/dL      Acetaminophen <10.0 ug/mL      Ethanol <5 mg/dL     Comprehensive metabolic panel [[id number]]  (Abnormal) Collected: 03/12/22 1916    Specimen: Blood, Venous Updated: 03/12/22 1947     Sodium 136 mEQ/L      Potassium 4.1 mEQ/L      Comment: Results obtained on plasma. Plasma Potassium values may be up to 0.4 mEQ/L less than serum values. The differences may be greater for patients with high platelet or white cell counts.        Chloride 104 mEQ/L      CO2 26 mEQ/L      BUN 20 mg/dL      Creatinine 0.9 mg/dL      Glucose 102 mg/dL      Calcium 8.8 mg/dL      AST (SGOT) 45 IU/L      ALT (SGPT) 105 IU/L      Alkaline Phosphatase 49 IU/L      Total Protein 6.8 g/dL      Comment: Test performed on plasma which typically contains approximately 0.4 g/dL more protein than serum.        Albumin 4.1 g/dL      Bilirubin, Total 1.0 mg/dL      eGFR >60.0 mL/min/1.73m*2      Anion Gap 6 mEQ/L     CBC and differential [[id number]] Collected: 03/12/22 1916    Specimen: Blood, Venous Updated: 03/12/22 1931     WBC 7.38 K/uL      RBC 4.70 M/uL      Hemoglobin 14.6 g/dL      Hematocrit 44.8 %      MCV 95.3 fL      MCH 31.1 pg      MCHC 32.6 g/dL      RDW 14.1 %      Platelets 152 K/uL      MPV 10.5 fL      Differential Type Auto     nRBC 0.0 %      Immature Granulocytes 0.3 %      Neutrophils 65.8 %      Lymphocytes 20.6 %      Monocytes 11.9 %      Eosinophils 1.1 %      Basophils 0.3 %      Immature Granulocytes, Absolute 0.02 K/uL      Neutrophils, Absolute 4.86 K/uL      Lymphocytes, Absolute 1.52 K/uL      Monocytes, Absolute 0.88 K/uL      Eosinophils, Absolute 0.08 K/uL      Basophils, Absolute 0.02 K/uL     Lactate, w/ reflex repeat if > 2.0  [[id number]]  (Normal) Collected: 03/12/22 1922    Specimen: Blood, Venous Updated: 03/12/22 1930     Lactate 1.0 mmol/L     RAINBOW LT BLUE [[id number]] Collected: 03/12/22 1915    Specimen: Blood, Venous Updated: 03/12/22 1928    RAINBOW RED [[id number]] Collected: 03/12/22 1915    Specimen: Blood, Venous Updated: 03/12/22 1928    [facility] Draw Panel [[id number]] Collected: 03/12/22 1915    Specimen: Blood, Venous Updated: 03/12/22 1928    Narrative:      The following orders were created for panel order [facility] Draw Panel.  Procedure                               Abnormality         Status                     ---------                               -----------         ------                     RAINBOW RED[[id number]]                                      In process                 RAINBOW LT BLUE[[id number]]                                  In process                 RAINBOW GOLD[[id number]]                                     In process                   Please view results for these tests on the individual orders.    MURIEL GOLD [[id number]] Collected: 03/12/22 1915    Specimen: Blood, Venous Updated: 03/12/22 1928          Imaging Results          ULTRASOUND ABDOMEN LIMITED (Final result)  Result time 03/12/22 20:32:42    Final result                 Impression:    IMPRESSION:  Hepatic steatosis.  No evidence for cholelithiasis, acute cholecystitis,  or biliary ductal  dilatation.  No hydronephrosis of the right kidney.             Narrative:      CLINICAL HISTORY:  Elevated liver enzymes, vomiting, diarrhea.    COMMENT: A limited abdominal ultrasound with attention to the right upper  quadrant is performed.  Grayscale and color Doppler imaging is utilized.    COMPARISON: There are no prior examinations available for comparison.    Liver:  The liver measures 14.2 cm in length. The liver is echogenic, consistent  with hepatic steatosis with multiple areas of focal fatty sparing.. No solid  hepatic lesions are seen.  There is no ascites in the upper abdomen.    Biliary tree:  There is no intra or extra hepatic biliary duct dilatation with  the common bile duct measuring 4.2 mm.    Gallbladder:  No gallstones are seen within the gallbladder. There is no  gallbladder wall thickening or pericholecystic fluid identified. A positive  sonographic Tena's sign is not elicited.  Incidentally noted is a NG and 10.    Pancreas:   The pancreatic head and body are normal.  The tail is suboptimally  visualized secondary to overlying bowel gas.    Right Kidney:  The right kidney measures 11.2 cm in length.  The kidney is  normal in echogenicity.  There is no evidence for hydronephrosis or renal  calculi.                                No orders to display       Scoring tools                                 ED Course & MDM   MDM / ED COURSE / CLINICAL IMPRESSIONS / DISPO     MDM    ED Course as of 03/12/22 2117   Sat Mar 12, 2022   1847 Impression: Abnormal WBC and elevated liver enzymes, N/V/D ~2 days ago    Plan: Labs, Guaiac, RUQ ultrasound [SG]   1918 Discussed with Dr. Haque. Adding on coags and lactate. [SG]   1957 WBC: 7.38  Improved from yesterday [SG]   1959 Comprehensive metabolic panel(!)  LFTs elevated but improved from yesterday [SG]   2033 Guaiac trace positive. No estrella blood. [SG]   2042 ULTRASOUND ABDOMEN LIMITED  Hepatic steatosis.  No evidence for cholelithiasis, acute cholecystitis,  or biliary ductal dilatation.  No hydronephrosis of the right kidney. [SG]   2107 Results reviewed with patient. Education on healthy diet choices given. Patient and family verbalized understanding. Discharge instructions reviewed with patient. Verbalized understanding. All questions answered. Signature obtained. IV removed, bleeding controlled, dressing placed.  [SG]      ED Course User Index  [SG] Earline Chan PA C         Clinical Impressions as of 03/12/22 2117   Hepatic steatosis   Rash     Discharge         Earline Chan PA C  03/12/22  [id number]

## 2022-03-12 NOTE — DISCHARGE INSTRUCTIONS
As discussed, please call your PCP Dr. Chowdary today to inform them of your ER visit and arrange a follow-up appointment within the next 2-3 days. Please return to the ED for any worsening, new, or concerning symptoms such as fever, chest pain, shortness of breath, worsening abdominal pain, persistent vomiting, increase in blood in stool, worsening rash, etc.    Please follow-up with Gastroenterology.  If you do not have one, we have provided you with one. Just call the office and ask for the earliest available appointment.    You may continue to use OTC steroid cream on your rash.

## 2022-03-12 NOTE — ED PROVIDER NOTES
"History  No chief complaint on file.    Attended the [employer] game yesterday, had 2-3 beers, pizza from 7-10:30 and a cupcake at home.   He laid down to go to bed and felt pain in his stomach, which led to loose stool that progressed to watery stool and vomiting.   He was up every 30-45min until early this AM with diarrhea and vomiting.   He woke up this AM, when he attempts to pass gas, he instead he passes \"mucousy blood\" covering a good portion of the toilet paper, \"it looked like a (smaller, [id number]) placenta.\"  The last time he passed mucousy stool was approx 1hr ago.   Pain has improved, last episode of vomiting was approx 17hrs ago.   Diet is now blackberrys, grapes, chicken noodle, crackers and water with electrolyte.   Colonoscopy at 50yo, overdue due to COVID.       History provided by:  Patient   used: No        No past medical history on file.    No past surgical history on file.    No family history on file.    Social History     Tobacco Use   • Smoking status: Not on file   • Smokeless tobacco: Not on file   Substance Use Topics   • Alcohol use: Not on file   • Drug use: Not on file       Review of Systems   Constitutional: Negative for chills, fatigue and fever.   Respiratory: Negative for shortness of breath.    Gastrointestinal: Positive for anal bleeding, blood in stool, diarrhea, nausea and vomiting. Negative for abdominal distention, abdominal pain, constipation and rectal pain.   Neurological: Negative for dizziness.       Physical Exam  ED Triage Vitals [03/11/22 1904]   Temp Heart Rate Resp BP SpO2   37.2 °C (98.9 °F) 75 20 120/74 94 %      Temp Source Heart Rate Source Patient Position BP Location FiO2 (%) (Set)   Oral -- Sitting Left upper arm --       Physical Exam  Constitutional:       General: He is not in acute distress.     Appearance: Normal appearance.   HENT:      Head: Normocephalic.   Eyes:      Extraocular Movements: Extraocular movements intact.   Abdominal:     " " General: Abdomen is flat. Bowel sounds are decreased.      Palpations: Abdomen is soft.      Tenderness: There is no abdominal tenderness. There is no right CVA tenderness, left CVA tenderness, guarding or rebound. Negative signs include Tena's sign, Rovsing's sign and McBurney's sign.      Hernia: No hernia is present.   Musculoskeletal:         General: Normal range of motion.   Skin:     General: Skin is warm.      Capillary Refill: Capillary refill takes less than 2 seconds.   Neurological:      General: No focal deficit present.      Mental Status: He is alert and oriented to person, place, and time.           Procedures  Procedures    UC Course  Clinical Impressions as of 03/11/22 1947   (Final) Gastroenteritis, acute   (Under Consideration) Hematochezia       MDM  Number of Diagnoses or Management Options  Gastroenteritis, acute  Diagnosis management comments: Attended the [employer] game yesterday, had 2-3 beers, pizza from 7-10:30 and a cupcake at home.   He laid down to go to bed and felt pain in his stomach, which led to loose stool that progressed to watery stool and vomiting.   He was up every 30-45min until early this AM with diarrhea and vomiting.   He woke up this AM, when he attempts to pass gas, he instead he passes \"mucousy blood\" covering a good portion of the toilet paper, \"it looked like a (smaller, [id number]) placenta.\"  The last time he passed mucousy stool was approx 1hr ago.  Pain has improved, last episode of vomiting was approx 17hrs ago.   Diet is now blackberrys, grapes, chicken noodle, crackers and water with electrolyte.    Colonoscopy at 52yo, overdue due to COVID.   Exam shows decreased but present bowel sounds, NT/ND, no r/r/g. Otherwise, exam overall normal. Rectal exam and KUB declined.   Recommending hydration as a focus. Recommending G2 or mixing gatorade/powerade with water 50/50 for electrolytes.   Recommending liquid diet only until bleeding stops, to progress to BRAT diet as " tolerated. To d/c ASA until bleeding stops.   Handout provided with what Foods to avoid  Urged to seek emergent medical attention in the Emergency Department should symptoms persists/worsen.       Risk of Complications, Morbidity, and/or Mortality  Presenting problems: low  Diagnostic procedures: low  Management options: low    Critical Care  Total time providing critical care: < 30 minutes    Patient Progress  Patient progress: stable                 Amilcar Will DO  03/11/22 1952

## 2022-03-12 NOTE — DISCHARGE INSTRUCTIONS
Hold your Aspirin doses until your GI bleed has fully resolved.   __________________________________________________________    Staying hydrated is very important! Mix water with Gatorade 50/50 for fluid replenishment. Be sure to sip on fluids throughout the day to prevent dehydration.   You may instead wish to use an electrolyte additive to your water to replenish your lost electrolytes.    Check out Outrageous Ginger Brew for a ginger ale with REAL ginger, not just artificial flavoring (ie-Canda Dry, Schwepps, etc). Found in organic section of [employer].      Recommending liquid diet ONLY for the next few days. It is important to give your bowels a rest! Remember: INGEST NOTHING WITH RED COLORING!    After that, you may progress your diet to the BRAT diet, Bananas, Rice, Applesauce, Toast.  Keep you meals simple while your GI tract is rebounding.    Foods to avoid:   Coffee  Citrus fruits and juices  Carbonated Beverages  Chocolate    If your symptoms worsen, persistent bleeding, worsening abdominal pain, dizziness, fever, etc, we recommend seeking care in the Emergency department for further evaluation and treatment.     Please let us know about your experience today!   Your input is truly appreciated and helps Dr. Will and your team at [facility] to continue to provide a superior level of service!   Get Well Soon!

## 2022-03-19 NOTE — ED ATTESTATION NOTE
Procedures  Physical Exam  Review of Systems    3/19/473023:00 AM  I have personally seen and examined the patient.  I reviewed and agree with the PA/NP/Resident's assessment and plan of care.    My examination, assessment, and plan of care of Chano Leon is as follows:  The patient presents with 61 yo M with no PMH p/w improving vomiting/ diarrhea which began ~ 2 nights prior after attending [employer] game. Developed diarrhea, vomiting overnight and following day. Has not vomited recently and diarrhea is gradually improving. Has passed some bloody mucous today but decreased stool / diarrhea output. No abd pain. No fevers. No urinary complaints. Also developed rash on BL forearms which he noticed after getting out of pool yesterday AM. Went to urgent care yesterday and had labs drawn which showed mild LFT elevated so was referred to ED for further eval today. No SOB or CP or wheezing. Has been tolerating normal PO intake today.   Exam: vitals wnl, RRR, CTAB, no murmurs/ rales, abd s/nt/nd, see PA note for rectal exam. RODRIGUEZ equally. Trace erythema on forearms, nontender, blanching, nonwarm.  Impression/Plan: Suspect likely food poisoning, possible gastroenteritis given self limited and spontaneously improving symptoms. Doubt sepsis, liver/ biliary pathology. Possible viral illness. History / exam not suggestive of ischemic colitis, or diverticular bleed.Possible contact dermatitis. Do not suspect cellulitis at present time. Will recheck labs, RUQ u/s. IF workup wnl, will recommend close f/u with PCP, strict return precautions for new/ worsening symptoms.     Vital Signs Review: Vital signs have been reviewed. The oxygen saturation is  SpO2: 94 % which is  wnl.    I was physically present for the key/critical portions of the following procedures: None    This document was created using dragon dictation software.  There might be some typographical errors due to this technology.    We are in a pandemic with increased  volumes and decreased capacity.      Marquis Haque MD  03/19/22 1104

## 2022-05-23 NOTE — PROGRESS NOTES
Cardiology  Office Consult Note         Reason for visit:   Chief Complaint   Patient presents with   • Cardiac Evaluation       HPI     Chano Leon is a 62 y.o. male who presents to the office for cardiovascular evaluation.    He is self referred due to family history of premature coronary artery disease. His brother Christiano who is 58 years old and my patient has dyslipidemia and was recently found to have CAD and had CABG on 22 at [facility].    He denies any chest pain, shortness of breath, edema, palpitations, near syncope or syncope.    He does aqua therapy for an hour 2 to 3 days per week and walks 45 minutes 3 to 4 days per week without an CV complaints.       Outside records reviewed..    Past Medical History:   Diagnosis Date   • Exercise-induced tachycardia    • Fatty liver    • Gastroenteritis    • Lipid disorder        Past Surgical History:   Procedure Laterality Date   • HAND SURGERY Right    • WISDOM TOOTH EXTRACTION          Social History     Tobacco Use   Smoking Status Never Smoker   Smokeless Tobacco Never Used     Social History     Tobacco Use   • Smoking status: Never Smoker   • Smokeless tobacco: Never Used   Substance Use Topics   • Alcohol use: Yes     Comment: social   • Drug use: Never       Family History   Problem Relation Age of Onset   • Lung disease Biological Mother    • Heart disease Biological Father         CABG in his 50s;  at age 70   • COPD Biological Father    • Heart disease Biological Brother         CABG at age 58   • Hyperlipidemia Biological Brother    • Hypertension Biological Brother    • No Known Problems Biological Sister    • Cancer Maternal Grandmother          in her mid 60s   • Heart attack Maternal Grandfather         multiple heart attacks starting at age 50   • Heart disease Maternal Grandfather         CHF -  in his 70s   • No Known Problems Biological Brother    • Hypertension Biological Brother    • Diabetes Biological Brother    •  Autoimmune disease Biological Sister    • Crohn's disease Biological Sister    • Autoimmune disease Biological Brother    • No Known Problems Biological Sister        Allergies:  Patient has no known allergies.    Current Outpatient Medications   Medication Sig Dispense Refill   • aspirin (ASPIR-81 ORAL)      • FISH OIL-omega-3-vit C-vit E (COROMEGA) 2,000-650-12 mg/2.5 gram emulsion in packet Take by mouth daily.     • multivitamin tablet Take by mouth daily.     • triamcinolone (KENALOG) 0.1 % cream Apply topically 2 (two) times a day for 7 days. 15 g 0   • NOT IN DATABASE daily. leveLDL -     • rosuvastatin (CRESTOR) 20 mg tablet Take 1 tablet (20 mg total) by mouth daily. 90 tablet 3     No current facility-administered medications for this visit.       Review of Systems   Constitutional: Negative for malaise/fatigue.   Cardiovascular: Negative for chest pain, dyspnea on exertion, irregular heartbeat, leg swelling, near-syncope, orthopnea, palpitations and syncope.   Hematologic/Lymphatic: Does not bruise/bleed easily.   Neurological: Negative for dizziness and light-headedness.   All other systems reviewed and are negative.      Objective     Vitals:    05/25/22 1323   BP: (!) 132/92   Pulse: 63   SpO2: 98%       Wt Readings from Last 1 Encounters:   06/23/22 97.1 kg (214 lb)       Body mass index is 29.85 kg/m².    Physical Exam  Vitals reviewed.   Constitutional:       General: He is not in acute distress.     Appearance: Normal appearance.   HENT:      Head: Normocephalic.   Eyes:      Extraocular Movements: Extraocular movements intact.   Cardiovascular:      Rate and Rhythm: Normal rate and regular rhythm.      Pulses: Normal pulses.      Heart sounds: Normal heart sounds.   Pulmonary:      Effort: Pulmonary effort is normal. No respiratory distress.      Breath sounds: Normal breath sounds. No wheezing or rales.   Abdominal:      General: There is no distension.      Palpations: Abdomen is soft.    Musculoskeletal:         General: Normal range of motion.      Cervical back: Normal range of motion.      Right lower leg: No edema.      Left lower leg: No edema.   Skin:     General: Skin is warm and dry.      Findings: No rash.   Neurological:      General: No focal deficit present.      Mental Status: He is alert and oriented to person, place, and time.   Psychiatric:         Mood and Affect: Mood normal.         Behavior: Behavior normal.         Thought Content: Thought content normal.         Judgment: Judgment normal.           ECG: Sinus bradycardia, otherwise unremarkable    Hematology  Lab Results   Component Value Date    WBC 7.38 03/12/2022    HGB 14.6 03/12/2022    HCT 44.8 03/12/2022     03/12/2022    INR 1.1 03/12/2022       Chemistries  Lab Results   Component Value Date     05/25/2022    K 4.8 05/25/2022     05/25/2022    CREATININE 1.0 05/25/2022    BUN 15 05/25/2022    CO2 27 05/25/2022    GLUCOSE 81 05/25/2022    CALCIUM 9.6 05/25/2022    ALT 40 05/25/2022    AST 27 05/25/2022       Cholesterol  Lab Results   Component Value Date    CHOL 225 (H) 05/25/2022    TRIG 102 05/25/2022    HDL 47 05/25/2022    LDLCALC 158 (H) 05/25/2022    NONHDLCALC 178 05/25/2022       Endocrine  No results found for: TSH, FREET4, HGBA1C    Assessment/Plan     Chest discomfort  The patient gets occasional episodes of slight chest discomfort.  He also gets some exertional dyspnea which is more prominent recently.  He has a strong family history of heart disease and that his brother has significant coronary disease at a relatively young age.  He has also had other relatives with coronary disease as well.  The patient has hyperlipidemia and borderline blood pressure elevation.  Because he is clearly at risk for coronary disease, and because he has symptoms which could potentially be related to coronary disease, I feel it is prudent to aggressively evaluate him.  With that in mind, I have asked him  to undergo coronary CT angiography and he is agreed.  He has no history of dye allergy.  His renal function has been normal in the recent past but we will recheck his labs along with his lipid profile.  We will discuss the results of his testing by phone and make further plans at that point.    No orders of the defined types were placed in this encounter.      There are no discontinued medications.    Orders Placed This Encounter   Procedures   • CTA CORONARY ARTERY WITH IV CONTRAST     Standing Status:   Future     Number of Occurrences:   1     Standing Expiration Date:   5/25/2023     Order Specific Question:   Release to patient     Answer:   Immediate   • Comprehensive metabolic panel     Standing Status:   Future     Number of Occurrences:   1     Standing Expiration Date:   5/25/2023     Order Specific Question:   Release to patient     Answer:   Immediate   • Lipid panel     Standing Status:   Future     Number of Occurrences:   1     Standing Expiration Date:   5/25/2023     Order Specific Question:   Release to patient     Answer:   Immediate   • ECG 12 lead     Scheduling Instructions:      PLEASE USE THIS ORDER FOR ECG'S PERFORMED IN PHYSICIAN OFFICES     Order Specific Question:   Release to patient     Answer:   Immediate       Follow Up Plans:  Return in about 4 weeks (around 6/22/2022) for Recheck, If needed.          I, Meri Sampson, am scribing for, and in the presence of, Kolton Church MD.    IKolton MD, personally performed the services described in this documentation as scribed by Meri Sampson in my presence, and it is both accurate and complete.     Kolton Church MD  8/15/2022      Addendum dated 8/15/2022:    The patient will be having debris removal from a knee within the next several days.  He had a recent coronary CT angiogram showing mild coronary disease but no significant obstructive disease.  His cardiac status is stable to undergo the planned procedure  without additional cardiovascular testing and with low to moderate and acceptable cardiovascular risk.      Kolton Church M.D.

## 2022-05-25 ENCOUNTER — OFFICE VISIT (OUTPATIENT)
Dept: CARDIOLOGY | Facility: CLINIC | Age: 62
End: 2022-05-25
Payer: COMMERCIAL

## 2022-05-25 ENCOUNTER — APPOINTMENT (OUTPATIENT)
Dept: LAB | Age: 62
End: 2022-05-25
Attending: INTERNAL MEDICINE
Payer: COMMERCIAL

## 2022-05-25 ENCOUNTER — TELEPHONE (OUTPATIENT)
Dept: CARDIOLOGY | Facility: CLINIC | Age: 62
End: 2022-05-25

## 2022-05-25 VITALS
HEIGHT: 71 IN | WEIGHT: 214 LBS | SYSTOLIC BLOOD PRESSURE: 132 MMHG | HEART RATE: 63 BPM | OXYGEN SATURATION: 98 % | BODY MASS INDEX: 29.96 KG/M2 | DIASTOLIC BLOOD PRESSURE: 92 MMHG

## 2022-05-25 DIAGNOSIS — R07.89 CHEST DISCOMFORT: Primary | ICD-10-CM

## 2022-05-25 DIAGNOSIS — E78.2 MIXED HYPERLIPIDEMIA: ICD-10-CM

## 2022-05-25 DIAGNOSIS — Z82.49 FAMILY HISTORY OF HEART DISEASE: ICD-10-CM

## 2022-05-25 DIAGNOSIS — R07.89 CHEST DISCOMFORT: ICD-10-CM

## 2022-05-25 LAB
ALBUMIN SERPL-MCNC: 4.1 G/DL (ref 3.4–5)
ALP SERPL-CCNC: 57 IU/L (ref 35–126)
ALT SERPL-CCNC: 40 IU/L (ref 16–63)
ANION GAP SERPL CALC-SCNC: 10 MEQ/L (ref 3–15)
AST SERPL-CCNC: 27 IU/L (ref 15–41)
BILIRUB SERPL-MCNC: 0.8 MG/DL (ref 0.3–1.2)
BUN SERPL-MCNC: 15 MG/DL (ref 8–20)
CALCIUM SERPL-MCNC: 9.6 MG/DL (ref 8.9–10.3)
CHLORIDE SERPL-SCNC: 105 MEQ/L (ref 98–109)
CHOLEST SERPL-MCNC: 225 MG/DL
CO2 SERPL-SCNC: 27 MEQ/L (ref 22–32)
CREAT SERPL-MCNC: 1 MG/DL (ref 0.8–1.3)
GFR SERPL CREATININE-BSD FRML MDRD: >60 ML/MIN/1.73M*2
GLUCOSE SERPL-MCNC: 81 MG/DL (ref 70–99)
HDLC SERPL-MCNC: 47 MG/DL
HDLC SERPL: 4.8 {RATIO}
LDLC SERPL CALC-MCNC: 158 MG/DL
NONHDLC SERPL-MCNC: 178 MG/DL
POTASSIUM SERPL-SCNC: 4.8 MEQ/L (ref 3.6–5.1)
PROT SERPL-MCNC: 6.1 G/DL (ref 6–8.2)
SODIUM SERPL-SCNC: 142 MEQ/L (ref 136–144)
TRIGL SERPL-MCNC: 102 MG/DL (ref 30–149)

## 2022-05-25 PROCEDURE — 36415 COLL VENOUS BLD VENIPUNCTURE: CPT

## 2022-05-25 PROCEDURE — 3008F BODY MASS INDEX DOCD: CPT | Performed by: INTERNAL MEDICINE

## 2022-05-25 PROCEDURE — 99204 OFFICE O/P NEW MOD 45 MIN: CPT | Performed by: INTERNAL MEDICINE

## 2022-05-25 PROCEDURE — 80061 LIPID PANEL: CPT

## 2022-05-25 PROCEDURE — 80053 COMPREHEN METABOLIC PANEL: CPT

## 2022-05-25 PROCEDURE — 93000 ELECTROCARDIOGRAM COMPLETE: CPT | Performed by: INTERNAL MEDICINE

## 2022-05-25 ASSESSMENT — ENCOUNTER SYMPTOMS
DYSPNEA ON EXERTION: 0
DIZZINESS: 0
ORTHOPNEA: 0
NEAR-SYNCOPE: 0
LIGHT-HEADEDNESS: 0
IRREGULAR HEARTBEAT: 0
PALPITATIONS: 0
SYNCOPE: 0
BRUISES/BLEEDS EASILY: 0

## 2022-05-25 NOTE — ASSESSMENT & PLAN NOTE
The patient gets occasional episodes of slight chest discomfort.  He also gets some exertional dyspnea which is more prominent recently.  He has a strong family history of heart disease and that his brother has significant coronary disease at a relatively young age.  He has also had other relatives with coronary disease as well.  The patient has hyperlipidemia and borderline blood pressure elevation.  Because he is clearly at risk for coronary disease, and because he has symptoms which could potentially be related to coronary disease, I feel it is prudent to aggressively evaluate him.  With that in mind, I have asked him to undergo coronary CT angiography and he is agreed.  He has no history of dye allergy.  His renal function has been normal in the recent past but we will recheck his labs along with his lipid profile.  We will discuss the results of his testing by phone and make further plans at that point.

## 2022-05-27 ENCOUNTER — TELEPHONE (OUTPATIENT)
Dept: CARDIOLOGY | Facility: CLINIC | Age: 62
End: 2022-05-27
Payer: COMMERCIAL

## 2022-05-27 NOTE — TELEPHONE ENCOUNTER
I had informed pt that he will need to wait till the contrast dye comes in. I did tell him that if the authorization expires that the office will precert the order  again.

## 2022-05-27 NOTE — TELEPHONE ENCOUNTER
I believe that the patient is safe to wait until dye becomes available for the CT scan.  The patient can contact us if he develops symptoms such as shortness of breath or chest pain which would require evaluation on a more urgent basis.

## 2022-05-27 NOTE — TELEPHONE ENCOUNTER
Patient called regarding his CT scan. He called to get scheduled and was told that because of the contrast shortage there may not be any availability until July but he is on a list. He was not sure whether he is ok to wait until July and his precert is only good until 07/10/2022. He was not sure where to go from here and asked for a call back for more information. Pt can be reached at [phone]

## 2022-06-07 ENCOUNTER — TELEPHONE (OUTPATIENT)
Dept: CARDIOLOGY | Facility: CLINIC | Age: 62
End: 2022-06-07
Payer: COMMERCIAL

## 2022-06-07 NOTE — TELEPHONE ENCOUNTER
Patient needs to have a CTA CORONARY ARTERY WITH IV CONTRAST   here at [facility]-      NPI#: [id number].       Please send back once complete.  Thanks!    .

## 2022-06-13 ENCOUNTER — TELEPHONE (OUTPATIENT)
Dept: SCHEDULING | Facility: CLINIC | Age: 62
End: 2022-06-13
Payer: COMMERCIAL

## 2022-06-13 NOTE — TELEPHONE ENCOUNTER
Pre-cert Request    Name of patient: Chano MANCILLA Carolyn    Name of physician: Kolton Church MD    Type of test: CTA CORONARY ARTERY W IV CONTRAST    Insurance: [employer]     Location having test done: [place]     NPI of location: [id number]    Scheduled/Expected date for test: expected to scheduled after July 7/15

## 2022-06-21 NOTE — TELEPHONE ENCOUNTER
Due to the shortage patient is unable to schedule test until after July 15th,  Can we please get the date extended on the precert so patient can get test completed.     If you have any questions or concerns feel free to give me a call    Central Scheduling cannot schedule test until precert is updated and date is extended..    Thank you.

## 2022-06-22 NOTE — TELEPHONE ENCOUNTER
Per Umm NORWOOD from [facility], this particular plan does not allow extensions. (Ref# [id number])    New auth will be initiated after original auth expires on 07/10/22    Added to futures file

## 2022-06-22 NOTE — TELEPHONE ENCOUNTER
Called spoke with patient advised him of the issue with the precert along with his insurance company not allowing extensions. He will call his employer to see if they can speed up the process. He will call us back if he can. Provided R[place]les phone number if he has any more questions or concerns.

## 2022-06-23 ENCOUNTER — HOSPITAL ENCOUNTER (OUTPATIENT)
Dept: RADIOLOGY | Facility: HOSPITAL | Age: 62
Discharge: HOME | End: 2022-06-23
Attending: INTERNAL MEDICINE
Payer: COMMERCIAL

## 2022-06-23 VITALS
OXYGEN SATURATION: 97 % | DIASTOLIC BLOOD PRESSURE: 80 MMHG | SYSTOLIC BLOOD PRESSURE: 135 MMHG | BODY MASS INDEX: 29.96 KG/M2 | HEART RATE: 57 BPM | WEIGHT: 214 LBS | RESPIRATION RATE: 14 BRPM | HEIGHT: 71 IN

## 2022-06-23 DIAGNOSIS — R07.89 CHEST DISCOMFORT: ICD-10-CM

## 2022-06-23 DIAGNOSIS — Z82.49 FAMILY HISTORY OF HEART DISEASE: ICD-10-CM

## 2022-06-23 DIAGNOSIS — E78.2 MIXED HYPERLIPIDEMIA: ICD-10-CM

## 2022-06-23 PROCEDURE — 0503T HC COR FFR ALYS GNRJ FFR MDL: CPT

## 2022-06-23 PROCEDURE — 63700000 HC SELF-ADMINISTRABLE DRUG: Performed by: INTERNAL MEDICINE

## 2022-06-23 PROCEDURE — 63600105 HC IODINE BASED CONTRAST: Mod: JW | Performed by: INTERNAL MEDICINE

## 2022-06-23 PROCEDURE — 75574 CT ANGIO HRT W/3D IMAGE: CPT | Mod: ME

## 2022-06-23 PROCEDURE — G1004 CDSM NDSC: HCPCS

## 2022-06-23 PROCEDURE — 4A033BC MEASUREMENT OF ARTERIAL PRESSURE, CORONARY, PERCUTANEOUS APPROACH: ICD-10-PCS | Performed by: INTERNAL MEDICINE

## 2022-06-23 RX ORDER — METOPROLOL TARTRATE 1 MG/ML
5 INJECTION, SOLUTION INTRAVENOUS AS NEEDED
Status: DISCONTINUED | OUTPATIENT
Start: 2022-06-23 | End: 2022-06-24 | Stop reason: HOSPADM

## 2022-06-23 RX ORDER — METOPROLOL TARTRATE 50 MG/1
50 TABLET ORAL ONCE
Status: COMPLETED | OUTPATIENT
Start: 2022-06-23 | End: 2022-06-23

## 2022-06-23 RX ORDER — NITROGLYCERIN 0.4 MG/1
0.4 TABLET SUBLINGUAL ONCE
Status: COMPLETED | OUTPATIENT
Start: 2022-06-23 | End: 2022-06-23

## 2022-06-23 RX ORDER — IVABRADINE 7.5 MG/1
15 TABLET, FILM COATED ORAL ONCE
Status: COMPLETED | OUTPATIENT
Start: 2022-06-23 | End: 2022-06-23

## 2022-06-23 RX ORDER — METOPROLOL TARTRATE 50 MG/1
50 TABLET ORAL EVERY 30 MIN PRN
Status: DISCONTINUED | OUTPATIENT
Start: 2022-06-23 | End: 2022-06-24 | Stop reason: HOSPADM

## 2022-06-23 RX ADMIN — IVABRADINE 15 MG: 7.5 TABLET, FILM COATED ORAL at 09:06

## 2022-06-23 RX ADMIN — NITROGLYCERIN 0.4 MG: 0.4 TABLET SUBLINGUAL at 09:52

## 2022-06-23 RX ADMIN — IOHEXOL 190 ML: 350 INJECTION, SOLUTION INTRAVENOUS at 10:14

## 2022-06-23 RX ADMIN — NITROGLYCERIN 0.4 MG: 0.4 TABLET SUBLINGUAL at 10:01

## 2022-06-23 RX ADMIN — METOPROLOL TARTRATE 50 MG: 50 TABLET ORAL at 09:06

## 2022-06-23 NOTE — DISCHARGE INSTRUCTIONS
Chano MANCILLA [facility]   [id number]   06/23/22    Cardiac CTA Patient Discharge Instructions      Thank you for allowing our CT staff to participate in your care today.  We would like to provide you with some easy to follow instructions before you leave.    DRINK PLENTY OF FLUIDS  Now that your scan is complete, you will need to drink plenty of fluids, preferably water.    DO NOT drink any caffeinated beverages the rest of the day (coffee, tea, caffeinated sodas).    DO NOT drink any alcoholic beverages for the next 24 hours.  We ask you to drink these fluids to dilute the x-ray dye that was used for your scan and allow it to flush through your kidneys.  Caffeine and alcohol will not allow this flushing to occur effectively.       MEDICATION CHANGES:  {YES/NO/WILD CARDS:80110}    If you have any questions about this, please contact your primary care physician.    If you need immediate medical attention, please go to the nearest Emergency Department or dial 911.    By signing this form, I acknowledge these instructions have been explained to me to my satisfaction and all my questions have been answered.  I have received a copy of this form.

## 2022-06-23 NOTE — PROGRESS NOTES
Staff present during Radiology Nurse encounter:    Nurse: KATLYN Lawrence  Technologist: ORTIZ Madden    Cardiologist: JOSE Mac  Other: 20 g hi flow iv right ac 210 contrast   Cardiac CTA Worksheet    Chest pain (symptomatic patients):  Intermediate pre-test probability of Coronary Artery Disease        Ivabradine (CORLANOR) 15mg, metoprolol tartrate (LOPRESSOR) 50mg and nitroglycerin (NITROSTAT) .4mg x2

## 2022-06-27 ENCOUNTER — TELEPHONE (OUTPATIENT)
Dept: CARDIOLOGY | Facility: CLINIC | Age: 62
End: 2022-06-27
Payer: COMMERCIAL

## 2022-06-27 DIAGNOSIS — E78.00 HYPERCHOLESTEROLEMIA: Primary | ICD-10-CM

## 2022-06-27 DIAGNOSIS — I25.10 CORONARY ARTERY DISEASE INVOLVING NATIVE CORONARY ARTERY OF NATIVE HEART WITHOUT ANGINA PECTORIS: ICD-10-CM

## 2022-06-27 RX ORDER — ROSUVASTATIN CALCIUM 20 MG/1
20 TABLET, COATED ORAL DAILY
Qty: 90 TABLET | Refills: 3 | Status: SHIPPED | OUTPATIENT
Start: 2022-06-27 | End: 2022-10-31

## 2022-06-27 NOTE — TELEPHONE ENCOUNTER
I called the patient and discussed his results with him.  He will be starting statin therapy and we should contact him to set up a follow-up visit in 8-12 weeks.  He should probably have blood work performed right before returning; I will place an order for those labs in his chart.  If you could, please call the patient to set up a return office visit, and send him the lab requests.  Thank you.

## 2022-06-27 NOTE — PROGRESS NOTES
.I called the patient and discussed the results of his coronary CT angiogram with him.  He has mild to moderate coronary disease given his family history, I believe it is prudent to treat him for hyperlipidemia at this time, especially given his recent lipid profile.  He will begin rosuvastatin at 20 mg a day and we will see him again in 2 months.  We will obtain blood work at that time if it appears that he is clinically tolerating the medication.  He will contact me in the meantime with problems or questions.  We discussed potential side effects of the medication and he will keep me informed in this regard.

## 2022-06-27 NOTE — TELEPHONE ENCOUNTER
Patient called requesting a call back from you to discuss results of CTA Coronary Artery W/ Contrast.    Pt c/b # [phone]

## 2022-07-21 ENCOUNTER — APPOINTMENT (OUTPATIENT)
Dept: URBAN - METROPOLITAN AREA CLINIC 203 | Age: 62
Setting detail: DERMATOLOGY
End: 2022-07-22

## 2022-07-21 DIAGNOSIS — Z85.828 PERSONAL HISTORY OF OTHER MALIGNANT NEOPLASM OF SKIN: ICD-10-CM

## 2022-07-21 DIAGNOSIS — L57.0 ACTINIC KERATOSIS: ICD-10-CM

## 2022-07-21 DIAGNOSIS — L82.1 OTHER SEBORRHEIC KERATOSIS: ICD-10-CM

## 2022-07-21 DIAGNOSIS — B07.8 OTHER VIRAL WARTS: ICD-10-CM

## 2022-07-21 DIAGNOSIS — Z11.52 ENCOUNTER FOR SCREENING FOR COVID-19: ICD-10-CM

## 2022-07-21 DIAGNOSIS — L57.8 OTHER SKIN CHANGES DUE TO CHRONIC EXPOSURE TO NONIONIZING RADIATION: ICD-10-CM

## 2022-07-21 PROCEDURE — 99213 OFFICE O/P EST LOW 20 MIN: CPT | Mod: 25

## 2022-07-21 PROCEDURE — OTHER LIQUID NITROGEN: OTHER

## 2022-07-21 PROCEDURE — OTHER REASSURANCE: OTHER

## 2022-07-21 PROCEDURE — OTHER SUNSCREEN RECOMMENDATIONS: OTHER

## 2022-07-21 PROCEDURE — 17003 DESTRUCT PREMALG LES 2-14: CPT

## 2022-07-21 PROCEDURE — OTHER SCREENING FOR COVID-19: OTHER

## 2022-07-21 PROCEDURE — OTHER COUNSELING: OTHER

## 2022-07-21 PROCEDURE — 17000 DESTRUCT PREMALG LESION: CPT

## 2022-07-21 ASSESSMENT — LOCATION DETAILED DESCRIPTION DERM
LOCATION DETAILED: RIGHT CENTRAL MALAR CHEEK
LOCATION DETAILED: LEFT INFERIOR TEMPLE
LOCATION DETAILED: LEFT MEDIAL FOREHEAD
LOCATION DETAILED: NASAL DORSUM
LOCATION DETAILED: RIGHT INFERIOR FLANK
LOCATION DETAILED: RIGHT LATERAL ABDOMEN
LOCATION DETAILED: NASAL ROOT
LOCATION DETAILED: LEFT MEDIAL INFERIOR CHEST
LOCATION DETAILED: RIGHT SUPERIOR FLANK
LOCATION DETAILED: LEFT ANTERIOR PROXIMAL THIGH
LOCATION DETAILED: EPIGASTRIC SKIN
LOCATION DETAILED: LEFT LATERAL ABDOMEN

## 2022-07-21 ASSESSMENT — LOCATION ZONE DERM
LOCATION ZONE: LEG
LOCATION ZONE: NOSE
LOCATION ZONE: TRUNK
LOCATION ZONE: FACE

## 2022-07-21 ASSESSMENT — LOCATION SIMPLE DESCRIPTION DERM
LOCATION SIMPLE: CHEST
LOCATION SIMPLE: LEFT TEMPLE
LOCATION SIMPLE: ABDOMEN
LOCATION SIMPLE: NOSE
LOCATION SIMPLE: RIGHT CHEEK
LOCATION SIMPLE: LEFT THIGH
LOCATION SIMPLE: LEFT FOREHEAD

## 2022-07-21 ASSESSMENT — PAIN INTENSITY VAS: HOW INTENSE IS YOUR PAIN 0 BEING NO PAIN, 10 BEING THE MOST SEVERE PAIN POSSIBLE?: NO PAIN

## 2022-08-02 ENCOUNTER — TRANSCRIBE ORDERS (OUTPATIENT)
Dept: SCHEDULING | Age: 62
End: 2022-08-02

## 2022-08-02 DIAGNOSIS — M25.461 EFFUSION, RIGHT KNEE: Primary | ICD-10-CM

## 2022-08-08 ENCOUNTER — HOSPITAL ENCOUNTER (OUTPATIENT)
Dept: RADIOLOGY | Facility: HOSPITAL | Age: 62
Discharge: HOME | End: 2022-08-08
Attending: SPECIALIST
Payer: COMMERCIAL

## 2022-08-08 DIAGNOSIS — M25.461 EFFUSION, RIGHT KNEE: ICD-10-CM

## 2022-08-08 PROCEDURE — 73721 MRI JNT OF LWR EXTRE W/O DYE: CPT | Mod: RT

## 2022-08-16 ENCOUNTER — TRANSCRIBE ORDERS (OUTPATIENT)
Dept: CARDIOLOGY | Facility: CLINIC | Age: 62
End: 2022-08-16

## 2022-08-16 ENCOUNTER — TELEPHONE (OUTPATIENT)
Dept: CARDIOLOGY | Facility: CLINIC | Age: 62
End: 2022-08-16
Payer: COMMERCIAL

## 2022-08-16 NOTE — TELEPHONE ENCOUNTER
Patient's Phone # [phone]    Patient is scheduled to have surgery on his right knee with Dr. Randolph Fair on 8/19/22    Patient said he need a Cardiac Clearance prior to then. // Patient said he will have Dr. Fair's office fax over the Clearance Request form for the  To review.

## 2022-10-10 ENCOUNTER — DOCUMENTATION (OUTPATIENT)
Dept: CARDIOLOGY | Facility: CLINIC | Age: 62
End: 2022-10-10
Payer: COMMERCIAL

## 2022-10-10 ENCOUNTER — TELEPHONE (OUTPATIENT)
Dept: CARDIOLOGY | Facility: CLINIC | Age: 62
End: 2022-10-10
Payer: COMMERCIAL

## 2022-10-10 NOTE — PROGRESS NOTES
The patient is having some soreness and tenderness of his sternum which is almost certainly a musculoskeletal issue.  I will examine him when he comes to the office in 2 days.

## 2022-10-10 NOTE — TELEPHONE ENCOUNTER
The patient called saying that yesterday he felt his sternum and it was tender.  He would not describe it as painful though.  He said he did not know how to explain it and he have never experienced this feeling before.  He said it is better today.  He does have an appointment on 10/12, but wanted to speak with Dr. Church before that just to see what it might be.  He can be reached at [phone].  Thank you

## 2022-10-11 PROBLEM — E78.2 MIXED HYPERLIPIDEMIA: Status: ACTIVE | Noted: 2022-10-11

## 2022-10-11 PROBLEM — Z82.49 FAMILY HISTORY OF CORONARY ARTERY DISEASE: Status: ACTIVE | Noted: 2022-10-11

## 2022-10-11 PROBLEM — I25.10 CORONARY ARTERY DISEASE INVOLVING NATIVE CORONARY ARTERY OF NATIVE HEART WITHOUT ANGINA PECTORIS: Status: ACTIVE | Noted: 2022-10-11

## 2022-10-11 NOTE — PROGRESS NOTES
Cardiology  Office Progress Note         Reason for visit:   Chief Complaint   Patient presents with    Follow-up       HPI     Chano Leon is a 62 y.o. male who presents to the office for cardiovascular follow up and management of coronary artery disease and dyslipidemia.  He also has a string family history of premature CAD.    He was seen in the office on 5/25/22.  At that visit I ordered a coronary CTA due to complaint of chest discomfort and family history and asked him to return in 4 weeks.    FLP 5/25/22:  , , HDL 47,     His coronary CTA on 6/23/22 showed:  1.  Multivessel coronary artery disease that is mild to moderate in severity and likely nonobstructive. CT FFR is normal except in the very distal portion of the second marginal.  2.  Aortic sclerosis. Otherwise normal cardiac structures.  3.  Coronary calcium score 126.  This places the patient in the HOUSER 64th risk percentile for age and gender matched individuals.    We discussed his results by phone on 6/27/22 and I started him on Crestor 20mg daily and asked him to have a repeat FLP and return in 3 months.    He phoned the office on 10/10/22 reporting tenderness in his sternum x2 to 3 days.  This has since resolved.    He denies any chest pain, shortness of breath, edema, palpitations, near syncope or syncope.    He will be having a repeat FLP tomorrow.       Past Medical History:   Diagnosis Date    Coronary artery disease     coronary CTA 6/23/22:  Multivessel coronary artery disease that is mild to moderate in severity and likely nonobstructive; CACS 126    Exercise-induced tachycardia     Fatty liver     Gastroenteritis     Lipid disorder        Past Surgical History:   Procedure Laterality Date    HAND SURGERY Right     WISDOM TOOTH EXTRACTION         Social History     Tobacco Use    Smoking status: Never    Smokeless tobacco: Never   Substance Use Topics    Alcohol use: Yes     Comment: social    Drug  use: Never       Family History   Problem Relation Age of Onset    Lung disease Biological Mother     Heart disease Biological Father         CABG in his 50s;  at age 70    COPD Biological Father     Heart disease Biological Brother         CABG at age 58    Hyperlipidemia Biological Brother     Hypertension Biological Brother     No Known Problems Biological Sister     Cancer Maternal Grandmother          in her mid 60s    Heart attack Maternal Grandfather         multiple heart attacks starting at age 50    Heart disease Maternal Grandfather         CHF -  in his 70s    No Known Problems Biological Brother     Hypertension Biological Brother     Diabetes Biological Brother     Autoimmune disease Biological Sister     Crohn's disease Biological Sister     Autoimmune disease Biological Brother     No Known Problems Biological Sister        Allergies:  Patient has no known allergies.    Current Outpatient Medications   Medication Sig Dispense Refill    aspirin (ASPIR-81 ORAL)       FISH OIL-omega-3-vit C-vit E (COROMEGA) 2,000-650-12 mg/2.5 gram emulsion in packet Take by mouth daily.      multivitamin tablet Take by mouth daily.      NOT IN DATABASE daily. leveLDL -      rosuvastatin (CRESTOR) 20 mg tablet Take 1 tablet (20 mg total) by mouth daily. 90 tablet 3    triamcinolone (KENALOG) 0.1 % cream Apply topically 2 (two) times a day for 7 days. 15 g 0     No current facility-administered medications for this visit.       Review of Systems   Constitutional: Negative for malaise/fatigue.   Cardiovascular: Negative for chest pain, dyspnea on exertion, irregular heartbeat, leg swelling, near-syncope, orthopnea, palpitations and syncope.   Hematologic/Lymphatic: Does not bruise/bleed easily.   Neurological: Negative for dizziness and light-headedness.   All other systems reviewed and are negative.      Objective     Vitals:    10/12/22 1350   BP: 140/90   Pulse: 60   SpO2: 98%        Wt Readings from Last 3 Encounters:   10/12/22 98.4 kg (217 lb)   06/23/22 97.1 kg (214 lb)   05/25/22 97.1 kg (214 lb)       Body mass index is 30.27 kg/m².    Physical Exam  Vitals and nursing note reviewed.   Constitutional:       General: He is not in acute distress.     Appearance: Normal appearance.   HENT:      Head: Normocephalic.   Eyes:      Extraocular Movements: Extraocular movements intact.   Cardiovascular:      Rate and Rhythm: Normal rate and regular rhythm.      Pulses: Normal pulses.      Heart sounds: Normal heart sounds.   Pulmonary:      Effort: Pulmonary effort is normal. No respiratory distress.      Breath sounds: Normal breath sounds. No wheezing or rales.   Abdominal:      General: There is no distension.      Palpations: Abdomen is soft.   Musculoskeletal:         General: Normal range of motion.      Cervical back: Normal range of motion.      Right lower leg: No edema.      Left lower leg: No edema.   Skin:     General: Skin is warm and dry.      Findings: No rash.   Neurological:      General: No focal deficit present.      Mental Status: He is alert and oriented to person, place, and time.   Psychiatric:         Mood and Affect: Mood normal.         Behavior: Behavior normal.         Thought Content: Thought content normal.         Judgment: Judgment normal.          ECG: Sinus rhythm, within normal limits    Hematology  Lab Results   Component Value Date    WBC 7.38 03/12/2022    HGB 14.6 03/12/2022    HCT 44.8 03/12/2022     03/12/2022    INR 1.1 03/12/2022       Chemistries  Lab Results   Component Value Date     05/25/2022    K 4.8 05/25/2022     05/25/2022    CREATININE 1.0 05/25/2022    BUN 15 05/25/2022    CO2 27 05/25/2022    GLUCOSE 81 05/25/2022    CALCIUM 9.6 05/25/2022    ALT 40 05/25/2022    AST 27 05/25/2022       Cholesterol  Lab Results   Component Value Date    CHOL 225 (H) 05/25/2022    TRIG 102 05/25/2022    HDL 47 05/25/2022    LDLCALC 158  (H) 05/25/2022    NONHDLCALC 178 05/25/2022       Endocrine  No results found for: TSH, FREET4, HGBA1C        Assessment/Plan     Chest discomfort  The patient's chest discomfort is undoubtedly musculoskeletal in origin.  It has resolved at this point.  His ECG is unremarkable.  He will continue risk factor modification.    Mixed hyperlipidemia  The patient appears to be tolerating statin therapy.  He will have lab work performed within the next few days and we can make further comments after reviewing the results.    Coronary artery disease involving native coronary artery of native heart without angina pectoris  The patient's coronary CTA shows mild to moderate diffuse coronary disease but no severe disease.  He will continue risk factor modification.      No orders of the defined types were placed in this encounter.      There are no discontinued medications.    Orders Placed This Encounter   Procedures    ECG 12 lead     Scheduling Instructions:      PLEASE USE THIS ORDER FOR ECG'S PERFORMED IN PHYSICIAN OFFICES     Order Specific Question:   Release to patient     Answer:   Immediate    ECG 12 lead     Scheduling Instructions:      PLEASE USE THIS ORDER FOR ECG'S PERFORMED IN PHYSICIAN OFFICES     Order Specific Question:   Release to patient     Answer:   Immediate       Follow Up Plans:  No follow-ups on file.          I, Meri Sampson, am scribing for, and in the presence of, Kolton Church MD.    IKolton MD, personally performed the services described in this documentation as scribed by Meri Sampson in my presence, and it is both accurate and complete.       Kolton Church MD  10/12/2022

## 2022-10-12 ENCOUNTER — OFFICE VISIT (OUTPATIENT)
Dept: CARDIOLOGY | Facility: CLINIC | Age: 62
End: 2022-10-12
Payer: COMMERCIAL

## 2022-10-12 VITALS
HEART RATE: 60 BPM | BODY MASS INDEX: 30.38 KG/M2 | SYSTOLIC BLOOD PRESSURE: 140 MMHG | HEIGHT: 71 IN | OXYGEN SATURATION: 98 % | DIASTOLIC BLOOD PRESSURE: 90 MMHG | WEIGHT: 217 LBS

## 2022-10-12 DIAGNOSIS — R07.89 CHEST DISCOMFORT: ICD-10-CM

## 2022-10-12 DIAGNOSIS — E78.2 MIXED HYPERLIPIDEMIA: ICD-10-CM

## 2022-10-12 DIAGNOSIS — I25.10 CORONARY ARTERY DISEASE INVOLVING NATIVE CORONARY ARTERY OF NATIVE HEART WITHOUT ANGINA PECTORIS: Primary | ICD-10-CM

## 2022-10-12 DIAGNOSIS — Z82.49 FAMILY HISTORY OF CORONARY ARTERY DISEASE: ICD-10-CM

## 2022-10-12 PROCEDURE — 93000 ELECTROCARDIOGRAM COMPLETE: CPT | Performed by: INTERNAL MEDICINE

## 2022-10-12 PROCEDURE — 3008F BODY MASS INDEX DOCD: CPT | Performed by: INTERNAL MEDICINE

## 2022-10-12 PROCEDURE — 99214 OFFICE O/P EST MOD 30 MIN: CPT | Performed by: INTERNAL MEDICINE

## 2022-10-12 ASSESSMENT — ENCOUNTER SYMPTOMS
ORTHOPNEA: 0
DIZZINESS: 0
IRREGULAR HEARTBEAT: 0
DYSPNEA ON EXERTION: 0
SYNCOPE: 0
NEAR-SYNCOPE: 0
BRUISES/BLEEDS EASILY: 0
LIGHT-HEADEDNESS: 0
PALPITATIONS: 0

## 2022-10-12 NOTE — ASSESSMENT & PLAN NOTE
The patient's chest discomfort is undoubtedly musculoskeletal in origin.  It has resolved at this point.  His ECG is unremarkable.  He will continue risk factor modification.

## 2022-10-12 NOTE — ASSESSMENT & PLAN NOTE
The patient appears to be tolerating statin therapy.  He will have lab work performed within the next few days and we can make further comments after reviewing the results.

## 2022-10-12 NOTE — ASSESSMENT & PLAN NOTE
The patient's coronary CTA shows mild to moderate diffuse coronary disease but no severe disease.  He will continue risk factor modification.

## 2022-10-13 ENCOUNTER — APPOINTMENT (OUTPATIENT)
Dept: LAB | Age: 62
End: 2022-10-13
Attending: INTERNAL MEDICINE
Payer: COMMERCIAL

## 2022-10-13 DIAGNOSIS — I25.10 CORONARY ARTERY DISEASE INVOLVING NATIVE CORONARY ARTERY OF NATIVE HEART WITHOUT ANGINA PECTORIS: ICD-10-CM

## 2022-10-13 DIAGNOSIS — E78.00 HYPERCHOLESTEROLEMIA: ICD-10-CM

## 2022-10-13 LAB
ALBUMIN SERPL-MCNC: 4 G/DL (ref 3.4–5)
ALP SERPL-CCNC: 65 IU/L (ref 35–126)
ALT SERPL-CCNC: 66 IU/L (ref 16–63)
ANION GAP SERPL CALC-SCNC: 9 MEQ/L (ref 3–15)
AST SERPL-CCNC: 34 IU/L (ref 15–41)
BILIRUB SERPL-MCNC: 0.9 MG/DL (ref 0.3–1.2)
BUN SERPL-MCNC: 17 MG/DL (ref 8–20)
CALCIUM SERPL-MCNC: 9.1 MG/DL (ref 8.9–10.3)
CHLORIDE SERPL-SCNC: 106 MEQ/L (ref 98–109)
CHOLEST SERPL-MCNC: 144 MG/DL
CO2 SERPL-SCNC: 25 MEQ/L (ref 22–32)
CREAT SERPL-MCNC: 1 MG/DL (ref 0.8–1.3)
GFR SERPL CREATININE-BSD FRML MDRD: >60 ML/MIN/1.73M*2
GLUCOSE SERPL-MCNC: 100 MG/DL (ref 70–99)
HDLC SERPL-MCNC: 44 MG/DL
HDLC SERPL: 3.3 {RATIO}
LDLC SERPL CALC-MCNC: 81 MG/DL
NONHDLC SERPL-MCNC: 100 MG/DL
POTASSIUM SERPL-SCNC: 4.3 MEQ/L (ref 3.6–5.1)
PROT SERPL-MCNC: 6.7 G/DL (ref 6–8.2)
SODIUM SERPL-SCNC: 140 MEQ/L (ref 136–144)
TRIGL SERPL-MCNC: 94 MG/DL (ref 30–149)

## 2022-10-13 PROCEDURE — 80061 LIPID PANEL: CPT

## 2022-10-13 PROCEDURE — 82040 ASSAY OF SERUM ALBUMIN: CPT

## 2022-10-13 PROCEDURE — 36415 COLL VENOUS BLD VENIPUNCTURE: CPT

## 2022-10-31 RX ORDER — ROSUVASTATIN CALCIUM 20 MG/1
TABLET, COATED ORAL
Qty: 90 TABLET | Refills: 3 | Status: SHIPPED | OUTPATIENT
Start: 2022-10-31 | End: 2023-02-22 | Stop reason: SDUPTHER

## 2022-10-31 NOTE — TELEPHONE ENCOUNTER
[facility] Refill Request      MA Notes:      Nurse Notes:      Last Office Visit: Visit date not found  Last Telemedicine Visit: Visit date not found    Next Office Visit: Visit date not found  Next Telemedicine Visit: Visit date not found     Most Recent BP Readings:  BP Readings from Last 3 Encounters:   10/12/22 140/90   06/23/22 135/80   05/25/22 (!) 132/92       Most recent Lab results:  Lab Results   Component Value Date    CHOL 144 10/13/2022    HDL 44 (L) 10/13/2022    TRIG 94 10/13/2022    NONHDLCALC 100 10/13/2022       Lab Results   Component Value Date    AST 34 10/13/2022    ALT 66 (H) 10/13/2022       Lab Results   Component Value Date     10/13/2022    K 4.3 10/13/2022    BUN 17 10/13/2022    CREATININE 1.0 10/13/2022       Current Medications:    Current Outpatient Medications:     aspirin (ASPIR-81 ORAL), , Disp: , Rfl:     FISH OIL-omega-3-vit C-vit E (COROMEGA) 2,000-650-12 mg/2.5 gram emulsion in packet, Take by mouth daily., Disp: , Rfl:     multivitamin tablet, Take by mouth daily., Disp: , Rfl:     NOT IN DATABASE, daily. leveLDL -, Disp: , Rfl:     rosuvastatin (CRESTOR) 20 mg tablet, Take 1 tablet (20 mg total) by mouth daily., Disp: 90 tablet, Rfl: 3    triamcinolone (KENALOG) 0.1 % cream, Apply topically 2 (two) times a day for 7 days., Disp: 15 g, Rfl: 0

## 2023-02-06 ENCOUNTER — TELEPHONE (OUTPATIENT)
Dept: PRIMARY CARE | Facility: CLINIC | Age: 63
End: 2023-02-06
Payer: COMMERCIAL

## 2023-02-06 NOTE — TELEPHONE ENCOUNTER
Pt stopped in office to see about scheduling a new patient visit with Dr Steele. States his brother is a patient.   I explained that Dr Steele is unable to absorb any more new patients- per [place]. He asked for Dr Steele to give him a call at [phone]

## 2023-02-16 ENCOUNTER — RX ONLY (RX ONLY)
Age: 63
End: 2023-02-16

## 2023-02-16 RX ORDER — TRIAMCINOLONE ACETONIDE OINTMENT 0.5 MG/G
OINTMENT TOPICAL
Qty: 430 | Refills: 3 | Status: ERX | COMMUNITY
Start: 2023-02-16

## 2023-02-22 ENCOUNTER — OFFICE VISIT (OUTPATIENT)
Dept: PRIMARY CARE | Facility: CLINIC | Age: 63
End: 2023-02-22
Payer: COMMERCIAL

## 2023-02-22 VITALS
TEMPERATURE: 97.2 F | DIASTOLIC BLOOD PRESSURE: 74 MMHG | OXYGEN SATURATION: 95 % | SYSTOLIC BLOOD PRESSURE: 128 MMHG | WEIGHT: 213 LBS | HEART RATE: 93 BPM | BODY MASS INDEX: 29.82 KG/M2 | RESPIRATION RATE: 18 BRPM | HEIGHT: 71 IN

## 2023-02-22 DIAGNOSIS — Z00.00 ROUTINE ADULT HEALTH MAINTENANCE: ICD-10-CM

## 2023-02-22 DIAGNOSIS — E78.00 PURE HYPERCHOLESTEROLEMIA: ICD-10-CM

## 2023-02-22 DIAGNOSIS — M25.521 RIGHT ELBOW PAIN: ICD-10-CM

## 2023-02-22 DIAGNOSIS — M25.511 CHRONIC RIGHT SHOULDER PAIN: ICD-10-CM

## 2023-02-22 DIAGNOSIS — K40.91 UNILATERAL RECURRENT INGUINAL HERNIA WITHOUT OBSTRUCTION OR GANGRENE: ICD-10-CM

## 2023-02-22 DIAGNOSIS — I25.10 ASCVD (ARTERIOSCLEROTIC CARDIOVASCULAR DISEASE): Primary | ICD-10-CM

## 2023-02-22 DIAGNOSIS — G89.29 CHRONIC RIGHT SHOULDER PAIN: ICD-10-CM

## 2023-02-22 PROBLEM — K64.9 HEMORRHOIDS: Status: ACTIVE | Noted: 2023-02-22

## 2023-02-22 PROBLEM — I47.10 PAROXYSMAL SUPRAVENTRICULAR TACHYCARDIA (CMS/HCC): Status: ACTIVE | Noted: 2023-02-22

## 2023-02-22 PROBLEM — E66.9 OBESITY: Status: ACTIVE | Noted: 2023-02-22

## 2023-02-22 PROBLEM — R06.2 WHEEZE: Status: ACTIVE | Noted: 2023-02-22

## 2023-02-22 PROBLEM — U07.1 COVID-19: Status: ACTIVE | Noted: 2023-02-22

## 2023-02-22 PROBLEM — R05.9 COUGH: Status: ACTIVE | Noted: 2023-02-22

## 2023-02-22 PROBLEM — I49.9 CARDIAC ARRHYTHMIA: Status: ACTIVE | Noted: 2023-02-22

## 2023-02-22 PROCEDURE — 3008F BODY MASS INDEX DOCD: CPT | Performed by: FAMILY MEDICINE

## 2023-02-22 PROCEDURE — 99396 PREV VISIT EST AGE 40-64: CPT | Performed by: FAMILY MEDICINE

## 2023-02-22 RX ORDER — ROSUVASTATIN CALCIUM 20 MG/1
20 TABLET, COATED ORAL DAILY
Qty: 90 TABLET | Refills: 3 | Status: SHIPPED | OUTPATIENT
Start: 2023-02-22 | End: 2023-10-12 | Stop reason: SDUPTHER

## 2023-02-22 RX ORDER — ROSUVASTATIN CALCIUM 20 MG/1
20 TABLET, COATED ORAL DAILY
Qty: 90 TABLET | Refills: 3 | Status: SHIPPED | OUTPATIENT
Start: 2023-02-22 | End: 2023-02-22 | Stop reason: SDUPTHER

## 2023-02-22 ASSESSMENT — ENCOUNTER SYMPTOMS
NERVOUS/ANXIOUS: 0
BACK PAIN: 0
NECK PAIN: 0
DIARRHEA: 0
FATIGUE: 0
CHEST TIGHTNESS: 0
PALPITATIONS: 0
ARTHRALGIAS: 1
TROUBLE SWALLOWING: 0
BLOOD IN STOOL: 0
ABDOMINAL PAIN: 0
MYALGIAS: 1
ACTIVITY CHANGE: 0
FREQUENCY: 0
WEAKNESS: 0
DYSURIA: 0
HEADACHES: 0
NAUSEA: 0
SHORTNESS OF BREATH: 0
EYE PAIN: 0

## 2023-02-22 ASSESSMENT — PATIENT HEALTH QUESTIONNAIRE - PHQ9: SUM OF ALL RESPONSES TO PHQ9 QUESTIONS 1 & 2: 0

## 2023-02-22 NOTE — PROGRESS NOTES
Daily Progress Note      Subjective      Patient ID: Chano Leon is a 63 y.o. male.    HPI  New patient  For problem list management  Sees cardio  See list    The following have been reviewed and updated as appropriate in this visit:   Problems       Review of Systems   Constitutional: Negative for activity change and fatigue.   HENT: Negative for congestion, ear pain, tinnitus and trouble swallowing.    Eyes: Negative for pain.   Respiratory: Negative for chest tightness and shortness of breath.    Cardiovascular: Negative for chest pain and palpitations.   Gastrointestinal: Negative for abdominal pain, blood in stool, diarrhea and nausea.   Genitourinary: Negative for dysuria, frequency and urgency.   Musculoskeletal: Positive for arthralgias and myalgias. Negative for back pain and neck pain.   Neurological: Negative for weakness and headaches.   Psychiatric/Behavioral: The patient is not nervous/anxious.    All other systems reviewed and are negative.      Current Outpatient Medications   Medication Sig Dispense Refill   • aspirin (ASPIR-81 ORAL)      • FISH OIL-omega-3-vit C-vit E (COROMEGA) 2,000-650-12 mg/2.5 gram emulsion in packet Take by mouth daily.     • multivitamin tablet Take by mouth daily.     • rosuvastatin (CRESTOR) 20 mg tablet Take 1 tablet (20 mg total) by mouth daily. 90 tablet 3   • triamcinolone (KENALOG) 0.1 % cream Apply topically 2 (two) times a day for 7 days. 15 g 0     No current facility-administered medications for this visit.     Past Medical History:   Diagnosis Date   • Coronary artery disease     coronary CTA 22:  Multivessel coronary artery disease that is mild to moderate in severity and likely nonobstructive; CACS 126   • Exercise-induced tachycardia    • Fatty liver    • Gastroenteritis    • Lipid disorder      Family History   Problem Relation Age of Onset   • Lung disease Biological Mother    • Heart disease Biological Father         CABG in his 50s;  at age  70   • COPD Biological Father    • Heart disease Biological Brother         CABG at age 58   • Hyperlipidemia Biological Brother    • Hypertension Biological Brother    • No Known Problems Biological Sister    • Cancer Maternal Grandmother          in her mid 60s   • Heart attack Maternal Grandfather         multiple heart attacks starting at age 50   • Heart disease Maternal Grandfather         CHF -  in his 70s   • No Known Problems Biological Brother    • Hypertension Biological Brother    • Diabetes Biological Brother    • Autoimmune disease Biological Sister    • Crohn's disease Biological Sister    • Autoimmune disease Biological Brother    • No Known Problems Biological Sister      Past Surgical History:   Procedure Laterality Date   • HAND SURGERY Right    • WISDOM TOOTH EXTRACTION       Social History     Socioeconomic History   • Marital status:      Spouse name: Not on file   • Number of children: Not on file   • Years of education: Not on file   • Highest education level: Not on file   Occupational History   • Not on file   Tobacco Use   • Smoking status: Never   • Smokeless tobacco: Never   Substance and Sexual Activity   • Alcohol use: Yes     Comment: social   • Drug use: Never   • Sexual activity: Not on file   Other Topics Concern   • Not on file   Social History Narrative   • Not on file     Social Determinants of Health     Financial Resource Strain: Not on file   Food Insecurity: No Food Insecurity   • Worried About Running Out of Food in the Last Year: Never true   • Ran Out of Food in the Last Year: Never true   Transportation Needs: Not on file   Physical Activity: Not on file   Stress: Not on file   Social Connections: Not on file   Intimate Partner Violence: Not on file   Housing Stability: Not on file     No Known Allergies    Objective   No new labs.    Physical Exam  Constitutional:       Appearance: Normal appearance. He is well-developed.   HENT:      Head: Normocephalic  and atraumatic.      Right Ear: External ear normal.      Left Ear: External ear normal.   Eyes:      Pupils: Pupils are equal, round, and reactive to light.   Neck:      Thyroid: No thyromegaly.   Cardiovascular:      Rate and Rhythm: Normal rate and regular rhythm.      Heart sounds: Normal heart sounds.   Pulmonary:      Effort: Pulmonary effort is normal. No respiratory distress.      Breath sounds: Normal breath sounds. No wheezing or rales.   Abdominal:      General: Bowel sounds are normal.      Palpations: Abdomen is soft. There is no mass.      Tenderness: There is no abdominal tenderness. There is no guarding or rebound.      Hernia: No hernia is present.   Musculoskeletal:         General: No deformity. Normal range of motion.      Cervical back: Normal range of motion and neck supple.   Skin:     General: Skin is warm and dry.   Neurological:      Mental Status: He is alert and oriented to person, place, and time.      Cranial Nerves: No cranial nerve deficit.   Psychiatric:         Behavior: Behavior normal.         Thought Content: Thought content normal.         Judgment: Judgment normal.         Assessment/Plan     Problem List Items Addressed This Visit        Other    Pure hypercholesterolemia    Relevant Medications    rosuvastatin (CRESTOR) 20 mg tablet    Other Relevant Orders    Comprehensive metabolic panel    Lipid panel   Other Visit Diagnoses     ASCVD (arteriosclerotic cardiovascular disease)    -  Primary    Relevant Orders    Comprehensive metabolic panel    Lipid panel    Routine adult health maintenance        Relevant Orders    Comprehensive metabolic panel    PSA    Lipid panel    Ambulatory referral to Gastroenterology    Unilateral recurrent inguinal hernia without obstruction or gangrene        Relevant Orders    Ambulatory referral to General Surgery    Right elbow pain        Chronic right shoulder pain            Orders Placed This Encounter   Procedures   • Comprehensive  metabolic panel     Standing Status:   Future     Standing Expiration Date:   2/22/2024     Order Specific Question:   Release to patient     Answer:   Immediate   • PSA     Standing Status:   Future     Standing Expiration Date:   2/22/2024     Order Specific Question:   Release to patient     Answer:   Immediate   • Lipid panel     Standing Status:   Future     Standing Expiration Date:   2/22/2024     Order Specific Question:   Release to patient     Answer:   Immediate   • Ambulatory referral to Gastroenterology     Standing Status:   Future     Standing Expiration Date:   8/22/2023     Referral Priority:   Routine     Referral Type:   Consultation     Referral Reason:   Evaluate and Treat     Referred to Provider:   Farhad Nascimento MD     Number of Visits Requested:   10   • Ambulatory referral to General Surgery     Standing Status:   Future     Standing Expiration Date:   8/22/2023     Referral Priority:   Routine     Referral Type:   Surgical     Referral Reason:   Specialty Services Required     Referred to Provider:   Randolph Stock MD     Number of Visits Requested:   1     Reviewed rx, dx, tx, hx, labs  Will refer for colo  Refer for sx consult  Sees cardio  Diet, ex, wgt reviewed,discussed  Stable on rx, c/w same  Check labs  Re-check 6 m  cta reviewed,d sicussed  [place], hpt, act mod, voltaren prroopa Steele, DO  2/22/2023

## 2023-02-28 ENCOUNTER — APPOINTMENT (OUTPATIENT)
Dept: LAB | Age: 63
End: 2023-02-28
Attending: FAMILY MEDICINE
Payer: COMMERCIAL

## 2023-02-28 DIAGNOSIS — Z00.00 ROUTINE ADULT HEALTH MAINTENANCE: ICD-10-CM

## 2023-02-28 DIAGNOSIS — I25.10 ASCVD (ARTERIOSCLEROTIC CARDIOVASCULAR DISEASE): ICD-10-CM

## 2023-02-28 DIAGNOSIS — E78.00 PURE HYPERCHOLESTEROLEMIA: ICD-10-CM

## 2023-02-28 LAB
ALBUMIN SERPL-MCNC: 4.3 G/DL (ref 3.4–5)
ALP SERPL-CCNC: 67 IU/L (ref 35–126)
ALT SERPL-CCNC: 45 IU/L (ref 16–63)
ANION GAP SERPL CALC-SCNC: 8 MEQ/L (ref 3–15)
AST SERPL-CCNC: 29 IU/L (ref 15–41)
BILIRUB SERPL-MCNC: 1 MG/DL (ref 0.3–1.2)
BUN SERPL-MCNC: 16 MG/DL (ref 8–20)
CALCIUM SERPL-MCNC: 9.5 MG/DL (ref 8.9–10.3)
CHLORIDE SERPL-SCNC: 108 MEQ/L (ref 98–109)
CHOLEST SERPL-MCNC: 165 MG/DL
CO2 SERPL-SCNC: 27 MEQ/L (ref 22–32)
CREAT SERPL-MCNC: 1 MG/DL (ref 0.8–1.3)
GFR SERPL CREATININE-BSD FRML MDRD: >60 ML/MIN/1.73M*2
GLUCOSE SERPL-MCNC: 94 MG/DL (ref 70–99)
HDLC SERPL-MCNC: 47 MG/DL
HDLC SERPL: 3.5 {RATIO}
LDLC SERPL CALC-MCNC: 105 MG/DL
NONHDLC SERPL-MCNC: 118 MG/DL
POTASSIUM SERPL-SCNC: 5 MEQ/L (ref 3.6–5.1)
PROT SERPL-MCNC: 6.5 G/DL (ref 6–8.2)
PSA SERPL-MCNC: 1.12 NG/ML
SODIUM SERPL-SCNC: 143 MEQ/L (ref 136–144)
TRIGL SERPL-MCNC: 65 MG/DL (ref 30–149)

## 2023-02-28 PROCEDURE — 80061 LIPID PANEL: CPT

## 2023-02-28 PROCEDURE — 84153 ASSAY OF PSA TOTAL: CPT

## 2023-02-28 PROCEDURE — 36415 COLL VENOUS BLD VENIPUNCTURE: CPT

## 2023-02-28 PROCEDURE — 80053 COMPREHEN METABOLIC PANEL: CPT

## 2023-03-02 ENCOUNTER — TELEPHONE (OUTPATIENT)
Dept: PRIMARY CARE | Facility: CLINIC | Age: 63
End: 2023-03-02
Payer: COMMERCIAL

## 2023-03-02 NOTE — TELEPHONE ENCOUNTER
Pt previous GI doctor, Dr. CHU for colonscopy does not accept pt's insurance any longer and pt is inquiring on a new recommendation as pt is due to schedule for one.  Will need new referral also.   Pt can be reached at [phone]

## 2023-03-20 ENCOUNTER — TELEPHONE (OUTPATIENT)
Dept: SURGERY | Facility: CLINIC | Age: 63
End: 2023-03-20
Payer: COMMERCIAL

## 2023-04-13 ENCOUNTER — OFFICE VISIT (OUTPATIENT)
Dept: PRIMARY CARE | Facility: CLINIC | Age: 63
End: 2023-04-13
Payer: COMMERCIAL

## 2023-04-13 ENCOUNTER — TELEPHONE (OUTPATIENT)
Dept: PRIMARY CARE | Facility: CLINIC | Age: 63
End: 2023-04-13

## 2023-04-13 VITALS
HEIGHT: 71 IN | WEIGHT: 210 LBS | TEMPERATURE: 98.6 F | SYSTOLIC BLOOD PRESSURE: 130 MMHG | OXYGEN SATURATION: 96 % | DIASTOLIC BLOOD PRESSURE: 72 MMHG | HEART RATE: 126 BPM | BODY MASS INDEX: 29.4 KG/M2 | RESPIRATION RATE: 18 BRPM

## 2023-04-13 DIAGNOSIS — J06.9 ACUTE URI: Primary | ICD-10-CM

## 2023-04-13 PROCEDURE — 99213 OFFICE O/P EST LOW 20 MIN: CPT | Performed by: NURSE PRACTITIONER

## 2023-04-13 PROCEDURE — 3008F BODY MASS INDEX DOCD: CPT | Performed by: NURSE PRACTITIONER

## 2023-04-13 RX ORDER — CEFDINIR 300 MG/1
300 CAPSULE ORAL 2 TIMES DAILY
Qty: 14 CAPSULE | Refills: 0 | Status: SHIPPED | OUTPATIENT
Start: 2023-04-13 | End: 2023-04-20

## 2023-04-13 RX ORDER — PROMETHAZINE HYDROCHLORIDE AND DEXTROMETHORPHAN HYDROBROMIDE 6.25; 15 MG/5ML; MG/5ML
5 SYRUP ORAL EVERY 4 HOURS PRN
Qty: 240 ML | Refills: 0 | Status: SHIPPED | OUTPATIENT
Start: 2023-04-13 | End: 2023-04-23

## 2023-04-13 RX ORDER — METHYLPREDNISOLONE 4 MG/1
TABLET ORAL
Qty: 21 TABLET | Refills: 0 | Status: SHIPPED | OUTPATIENT
Start: 2023-04-13 | End: 2023-04-20

## 2023-04-13 NOTE — PROGRESS NOTES
"  Subjective     Patient ID: Chano Leon is a 63 y.o. male.    HPI  Reports wife sick last month. He had fevers last week, congestion, sinus pressure, cough, sorethroat.   Hacking productive green mucus.   OTC cold medication, ibuprofen  Worsening over the past week.    Review of Systems   HENT: Positive for congestion, sinus pressure, sinus pain and sore throat.    Respiratory: Positive for cough.        Objective     Vitals:    04/13/23 1103   BP: 130/72   BP Location: Left upper arm   Patient Position: Sitting   Pulse: (!) 126   Resp: 18   Temp: 37 °C (98.6 °F)   TempSrc: Temporal   SpO2: 96%   Weight: 95.3 kg (210 lb)   Height: 1.803 m (5' 11\")     Body mass index is 29.29 kg/m².    Physical Exam  Vitals reviewed.   Constitutional:       Appearance: Normal appearance.   HENT:      Right Ear: Tympanic membrane, ear canal and external ear normal.      Left Ear: Tympanic membrane, ear canal and external ear normal.      Nose: Congestion and rhinorrhea present.      Mouth/Throat:      Pharynx: Posterior oropharyngeal erythema present.   Cardiovascular:      Rate and Rhythm: Normal rate and regular rhythm.      Heart sounds: Normal heart sounds.   Pulmonary:      Effort: Pulmonary effort is normal.      Breath sounds: Normal breath sounds.   Musculoskeletal:         General: Normal range of motion.   Skin:     General: Skin is warm and dry.   Neurological:      Mental Status: He is alert and oriented to person, place, and time.         Assessment/Plan   Diagnoses and all orders for this visit:    Acute URI (Primary)    Other orders  -     cefdinir (OMNICEF) 300 mg capsule; Take 1 capsule (300 mg total) by mouth 2 (two) times a day for 7 days.  -     methylPREDNISolone (MEDROL DOSEPACK) 4 mg tablet; Follow package directions.  -     promethazine-DM (PROMETHAZINE-DM) 6.25-15 mg/5 mL syrup; Take 5 mL by mouth every 4 (four) hours as needed for cough for up to 10 days.      Discussed and reviewed plan with patient " will do Medrol, cefdinir and promethazine prn. All questions and concerns have been addressed. Patient will contact the office if symptoms fail to improve or worsen.     NOLBERTO Dobbins

## 2023-04-13 NOTE — TELEPHONE ENCOUNTER
For a few weeks now, head congestion now traveled to chest. Didn't sleep well.  Coughing- green mucus.   OTC ibuprofen, decongestant for 1 week now. No improvement.   [facility] confirmed     (pt has a class in the building [phone] for a class, available after)

## 2023-04-16 ASSESSMENT — ENCOUNTER SYMPTOMS
SORE THROAT: 1
SINUS PAIN: 1
SINUS PRESSURE: 1
COUGH: 1

## 2023-04-18 ENCOUNTER — OFFICE VISIT (OUTPATIENT)
Dept: SURGERY | Facility: CLINIC | Age: 63
End: 2023-04-18
Payer: COMMERCIAL

## 2023-04-18 ENCOUNTER — TELEPHONE (OUTPATIENT)
Dept: SURGERY | Facility: CLINIC | Age: 63
End: 2023-04-18

## 2023-04-18 VITALS — BODY MASS INDEX: 29.4 KG/M2 | HEIGHT: 71 IN | WEIGHT: 210 LBS

## 2023-04-18 DIAGNOSIS — K40.90 REDUCIBLE RIGHT INGUINAL HERNIA: Primary | ICD-10-CM

## 2023-04-18 DIAGNOSIS — Z12.11 ENCOUNTER FOR SCREENING COLONOSCOPY FOR NON-HIGH-RISK PATIENT: ICD-10-CM

## 2023-04-18 PROCEDURE — 3008F BODY MASS INDEX DOCD: CPT | Performed by: SURGERY

## 2023-04-18 PROCEDURE — 99203 OFFICE O/P NEW LOW 30 MIN: CPT | Performed by: SURGERY

## 2023-04-18 RX ORDER — SODIUM CHLORIDE, SODIUM LACTATE, POTASSIUM CHLORIDE, CALCIUM CHLORIDE 600; 310; 30; 20 MG/100ML; MG/100ML; MG/100ML; MG/100ML
INJECTION, SOLUTION INTRAVENOUS CONTINUOUS
Status: CANCELLED | OUTPATIENT
Start: 2023-04-18 | End: 2023-04-19

## 2023-04-18 ASSESSMENT — ENCOUNTER SYMPTOMS
BLOOD IN STOOL: 0
WHEEZING: 0
COUGH: 0
ABDOMINAL PAIN: 0
ACTIVITY CHANGE: 0
CONSTIPATION: 0
DIARRHEA: 0
ABDOMINAL DISTENTION: 0
UNEXPECTED WEIGHT CHANGE: 0
PALPITATIONS: 0
CHEST TIGHTNESS: 0
BRUISES/BLEEDS EASILY: 0
SHORTNESS OF BREATH: 0
APPETITE CHANGE: 0

## 2023-04-18 NOTE — TELEPHONE ENCOUNTER
Per Dr Stock, patient will not be scheduling his Hernia surgery today. Patient will have a Colonoscopy first 5/1/2023. Patient signed consent, took home folder with instructions and wash. Patient will call back when ready to schedule.

## 2023-04-18 NOTE — PROGRESS NOTES
General Surgery Consult  Patient: Chano Leon  MRN: [id number]  1960    Visit Date: 2023  Encounter Provider: Randolph Stokc  Referring Provider: Dean Steele DO    Subjective   Consult, Hernia, and Colonoscopy      Chano Leon is a 63 y.o. male who was seen in consultation at the request of referring physician for management recommendations.  Chano presents to the office with a several year history of a small, nontender right inguinal hernia.  Chano has not noticed any left inguinal masses.  He has had no pelvic surgery or procedures.  Chano denies abdominal pain, nausea, vomiting, cough, wheezing or shortness of breath.  He has no chest pain on exertion or palpitations and no history of bleeding disorder.    Chano would also like to schedule screening colonoscopy.  He has had no change in his bowel habits, melena or blood per rectum and there is no family history of colorectal cancer.    Medical History:   Past Medical History:   Diagnosis Date   • Coronary artery disease     coronary CTA 22:  Multivessel coronary artery disease that is mild to moderate in severity and likely nonobstructive; CACS 126   • Exercise-induced tachycardia    • Fatty liver    • Gastroenteritis    • Lipid disorder        Surgical History:   Past Surgical History:   Procedure Laterality Date   • HAND SURGERY Right     50 years ago   • WISDOM TOOTH EXTRACTION         Social History:   Social History     Social History Narrative   • Not on file       Family History:   Family History   Problem Relation Age of Onset   • Lung disease Biological Mother    • Heart disease Biological Father         CABG in his 50s;  at age 70   • COPD Biological Father    • Heart disease Biological Brother         CABG at age 58   • Hyperlipidemia Biological Brother    • Hypertension Biological Brother    • No Known Problems Biological Sister    • Cancer Maternal Grandmother          in her mid 60s   • Heart attack Maternal  Grandfather         multiple heart attacks starting at age 50   • Heart disease Maternal Grandfather         CHF -  in his 70s   • No Known Problems Biological Brother    • Hypertension Biological Brother    • Diabetes Biological Brother    • Autoimmune disease Biological Sister    • Crohn's disease Biological Sister    • Autoimmune disease Biological Brother    • No Known Problems Biological Sister        Allergies: Patient has no known allergies.    Current Medications:  •  aspirin (ASPIR-81 ORAL)  •  cefdinir  •  FISH OIL-omega-3-vit C-vit E  •  methylPREDNISolone  •  multivitamin  •  rosuvastatin  •  promethazine-DM  •  triamcinolone    Review of Systems  Review of Systems   Constitutional: Negative for activity change, appetite change and unexpected weight change.   Respiratory: Negative for cough, chest tightness, shortness of breath and wheezing.    Cardiovascular: Negative for chest pain and palpitations.   Gastrointestinal: Negative for abdominal distention, abdominal pain, blood in stool, constipation and diarrhea.   Hematological: Does not bruise/bleed easily.       Objective     Physicial Exam  Physical Exam  Vitals and nursing note reviewed.   Constitutional:       General: He is not in acute distress.     Appearance: He is not ill-appearing, toxic-appearing or diaphoretic.   Eyes:      General: No scleral icterus.     Conjunctiva/sclera: Conjunctivae normal.   Pulmonary:      Effort: Pulmonary effort is normal. No respiratory distress.   Abdominal:      General: There is no distension.      Palpations: Abdomen is soft.      Tenderness: There is no abdominal tenderness. There is no guarding.      Hernia: A hernia is present.      Comments: There is a small, nontender and reducible right inguinal hernia.  There is no palpable left inguinal hernia.   Skin:     General: Skin is warm and dry.      Coloration: Skin is not jaundiced or pale.   Neurological:      Mental Status: He is alert and oriented to  person, place, and time.   Psychiatric:         Mood and Affect: Mood normal.         Behavior: Behavior normal.         Thought Content: Thought content normal.         Judgment: Judgment normal.           Labs  Lab Results   Component Value Date    WBC 7.38 03/12/2022    HGB 14.6 03/12/2022    HCT 44.8 03/12/2022    MCV 95.3 03/12/2022     03/12/2022       Lab Results   Component Value Date    GLUCOSE 94 02/28/2023    CALCIUM 9.5 02/28/2023     02/28/2023    K 5.0 02/28/2023    CO2 27 02/28/2023     02/28/2023    BUN 16 02/28/2023    CREATININE 1.0 02/28/2023       Lab Results   Component Value Date    ALT 45 02/28/2023    AST 29 02/28/2023    ALKPHOS 67 02/28/2023    BILITOT 1.0 02/28/2023         Imaging  Not applicable    Assessment     Reducible right inguinal hernia  Screening colonoscopy            Plan   Chano will have colonoscopy performed.  Chano expressed understanding of the indications for the procedure, the procedure which is planned, the alternatives, including doing nothing, and the risks and complications of each.  I did answer all of his questions to his satisfaction.    Chano will have his right inguinal hernia repaired. Chano expressed understanding of the indications for the procedure, the procedure which is planned, the alternatives, including doing nothing, and the risks and complications of each.  I did answer all of his questions to his satisfaction.          Randolph Stock MD

## 2023-04-18 NOTE — LETTER
April 18, 2023     Dean Steele DO  [address]  [place] [address]    Patient: Chano Leon  YOB: 1960  Date of Visit: 4/18/2023      Dear Dr. Steele:    Thank you for referring Chano Leon to me for evaluation. Below are my notes for this consultation.    If you have questions, please do not hesitate to call me. I look forward to following your patient along with you.         Sincerely,        Randolph Stock MD        CC: No Recipients    Randolph Stock MD  4/18/2023 12:45 PM  Signed  General Surgery Consult  Patient: Chano Leon  MRN: [id number]  1960    Visit Date: 4/18/2023  Encounter Provider: Randolph Stock  Referring Provider: Dean Steele DO    Subjective   Consult, Hernia, and Colonoscopy      Chano Leon is a 63 y.o. male who was seen in consultation at the request of referring physician for management recommendations.  Chano presents to the office with a several year history of a small, nontender right inguinal hernia.  Chano has not noticed any left inguinal masses.  He has had no pelvic surgery or procedures.  Chano denies abdominal pain, nausea, vomiting, cough, wheezing or shortness of breath.  He has no chest pain on exertion or palpitations and no history of bleeding disorder.    Chano would also like to schedule screening colonoscopy.  He has had no change in his bowel habits, melena or blood per rectum and there is no family history of colorectal cancer.    Medical History:   Past Medical History:   Diagnosis Date   • Coronary artery disease     coronary CTA 6/23/22:  Multivessel coronary artery disease that is mild to moderate in severity and likely nonobstructive; CACS 126   • Exercise-induced tachycardia    • Fatty liver    • Gastroenteritis    • Lipid disorder        Surgical History:   Past Surgical History:   Procedure Laterality Date   • HAND SURGERY Right     50 years ago   • WISDOM TOOTH EXTRACTION         Social History:    Social History     Social History Narrative   • Not on file       Family History:   Family History   Problem Relation Age of Onset   • Lung disease Biological Mother    • Heart disease Biological Father         CABG in his 50s;  at age 70   • COPD Biological Father    • Heart disease Biological Brother         CABG at age 58   • Hyperlipidemia Biological Brother    • Hypertension Biological Brother    • No Known Problems Biological Sister    • Cancer Maternal Grandmother          in her mid 60s   • Heart attack Maternal Grandfather         multiple heart attacks starting at age 50   • Heart disease Maternal Grandfather         CHF -  in his 70s   • No Known Problems Biological Brother    • Hypertension Biological Brother    • Diabetes Biological Brother    • Autoimmune disease Biological Sister    • Crohn's disease Biological Sister    • Autoimmune disease Biological Brother    • No Known Problems Biological Sister        Allergies: Patient has no known allergies.    Current Medications:  •  aspirin (ASPIR-81 ORAL)  •  cefdinir  •  FISH OIL-omega-3-vit C-vit E  •  methylPREDNISolone  •  multivitamin  •  rosuvastatin  •  promethazine-DM  •  triamcinolone    Review of Systems  Review of Systems   Constitutional: Negative for activity change, appetite change and unexpected weight change.   Respiratory: Negative for cough, chest tightness, shortness of breath and wheezing.    Cardiovascular: Negative for chest pain and palpitations.   Gastrointestinal: Negative for abdominal distention, abdominal pain, blood in stool, constipation and diarrhea.   Hematological: Does not bruise/bleed easily.       Objective     Physicial Exam  Physical Exam  Vitals and nursing note reviewed.   Constitutional:       General: He is not in acute distress.     Appearance: He is not ill-appearing, toxic-appearing or diaphoretic.   Eyes:      General: No scleral icterus.     Conjunctiva/sclera: Conjunctivae normal.   Pulmonary:       Effort: Pulmonary effort is normal. No respiratory distress.   Abdominal:      General: There is no distension.      Palpations: Abdomen is soft.      Tenderness: There is no abdominal tenderness. There is no guarding.      Hernia: A hernia is present.      Comments: There is a small, nontender and reducible right inguinal hernia.  There is no palpable left inguinal hernia.   Skin:     General: Skin is warm and dry.      Coloration: Skin is not jaundiced or pale.   Neurological:      Mental Status: He is alert and oriented to person, place, and time.   Psychiatric:         Mood and Affect: Mood normal.         Behavior: Behavior normal.         Thought Content: Thought content normal.         Judgment: Judgment normal.           Labs  Lab Results   Component Value Date    WBC 7.38 03/12/2022    HGB 14.6 03/12/2022    HCT 44.8 03/12/2022    MCV 95.3 03/12/2022     03/12/2022       Lab Results   Component Value Date    GLUCOSE 94 02/28/2023    CALCIUM 9.5 02/28/2023     02/28/2023    K 5.0 02/28/2023    CO2 27 02/28/2023     02/28/2023    BUN 16 02/28/2023    CREATININE 1.0 02/28/2023       Lab Results   Component Value Date    ALT 45 02/28/2023    AST 29 02/28/2023    ALKPHOS 67 02/28/2023    BILITOT 1.0 02/28/2023         Imaging  Not applicable    Assessment     Reducible right inguinal hernia  Screening colonoscopy           Plan   Chano will have colonoscopy performed.  Chano expressed understanding of the indications for the procedure, the procedure which is planned, the alternatives, including doing nothing, and the risks and complications of each.  I did answer all of his questions to his satisfaction.    Chano will have his right inguinal hernia repaired. Chano expressed understanding of the indications for the procedure, the procedure which is planned, the alternatives, including doing nothing, and the risks and complications of each.  I did answer all of his questions to his  satisfaction.          Randolph Stock MD

## 2023-04-27 ENCOUNTER — TELEPHONE (OUTPATIENT)
Dept: PRIMARY CARE | Facility: CLINIC | Age: 63
End: 2023-04-27
Payer: COMMERCIAL

## 2023-04-27 ENCOUNTER — TELEPHONE (OUTPATIENT)
Dept: SURGERY | Facility: CLINIC | Age: 63
End: 2023-04-27
Payer: COMMERCIAL

## 2023-04-27 RX ORDER — DOXYCYCLINE HYCLATE 100 MG
100 TABLET ORAL 2 TIMES DAILY
Qty: 14 TABLET | Refills: 0 | Status: SHIPPED | OUTPATIENT
Start: 2023-04-27 | End: 2023-05-04

## 2023-04-27 RX ORDER — BENZONATATE 200 MG/1
200 CAPSULE ORAL 3 TIMES DAILY PRN
Qty: 30 CAPSULE | Refills: 0 | Status: SHIPPED | OUTPATIENT
Start: 2023-04-27 | End: 2023-05-07

## 2023-04-27 RX ORDER — PROMETHAZINE HYDROCHLORIDE AND DEXTROMETHORPHAN HYDROBROMIDE 6.25; 15 MG/5ML; MG/5ML
5 SYRUP ORAL EVERY 4 HOURS PRN
Qty: 118 ML | Refills: 0 | Status: SHIPPED | OUTPATIENT
Start: 2023-04-27 | End: 2023-05-07

## 2023-04-27 NOTE — TELEPHONE ENCOUNTER
Pt calling back to advise he is still not 100% after course of medication. Pt still has colored mucus and mild cough in the AM. Pt has colonoscopy on Monday and wants to be ok so he doesn't have to be rescheduled. How to proceed?

## 2023-04-28 ENCOUNTER — TELEPHONE (OUTPATIENT)
Dept: SURGERY | Facility: CLINIC | Age: 63
End: 2023-04-28
Payer: COMMERCIAL

## 2023-04-28 NOTE — TELEPHONE ENCOUNTER
Spoke to Shanique at [facility]  cancelled patient due to just starting antibiotics there are calling patient and telling patient to call us to reschedule....

## 2023-05-02 ENCOUNTER — TELEPHONE (OUTPATIENT)
Dept: SURGERY | Facility: CLINIC | Age: 63
End: 2023-05-02
Payer: COMMERCIAL

## 2023-05-04 ENCOUNTER — TELEPHONE (OUTPATIENT)
Dept: SURGERY | Facility: CLINIC | Age: 63
End: 2023-05-04
Payer: COMMERCIAL

## 2023-05-05 ENCOUNTER — TELEPHONE (OUTPATIENT)
Dept: SURGERY | Facility: CLINIC | Age: 63
End: 2023-05-05
Payer: COMMERCIAL

## 2023-05-05 NOTE — TELEPHONE ENCOUNTER
Chano:  [phone]    Erica called and patient needs to RS colonoscopy with Dr Stock.  She gave him the date of Thursday July 20, 2023 - He would like to RS to this day    Please call to schedule

## 2023-05-12 ENCOUNTER — TELEPHONE (OUTPATIENT)
Dept: SURGERY | Facility: CLINIC | Age: 63
End: 2023-05-12
Payer: COMMERCIAL

## 2023-05-31 ENCOUNTER — TELEPHONE (OUTPATIENT)
Dept: PRIMARY CARE | Facility: CLINIC | Age: 63
End: 2023-05-31
Payer: COMMERCIAL

## 2023-05-31 RX ORDER — IPRATROPIUM BROMIDE 42 UG/1
2 SPRAY, METERED NASAL 4 TIMES DAILY
Qty: 15 ML | Refills: 12 | Status: SHIPPED | OUTPATIENT
Start: 2023-05-31 | End: 2023-11-08 | Stop reason: ALTCHOICE

## 2023-06-06 ENCOUNTER — TELEPHONE (OUTPATIENT)
Dept: PRIMARY CARE | Facility: CLINIC | Age: 63
End: 2023-06-06
Payer: COMMERCIAL

## 2023-06-06 NOTE — TELEPHONE ENCOUNTER
Pt c/o pain in right abdomen under ribs. When pt presses on area it is more painful.  Pain started yesterday, but is more painful today. Pt has not taken any OTC medication. Pain level is 4 or 5. Pain does not seem to be constant. More painful when pressing on area or moves a certain way. Pt is looking for SDS or appt tomorrow. PT lives 5 minutes away.

## 2023-06-07 ENCOUNTER — OFFICE VISIT (OUTPATIENT)
Dept: PRIMARY CARE | Facility: CLINIC | Age: 63
End: 2023-06-07
Payer: COMMERCIAL

## 2023-06-07 VITALS
DIASTOLIC BLOOD PRESSURE: 74 MMHG | HEIGHT: 71 IN | TEMPERATURE: 97.4 F | WEIGHT: 210 LBS | HEART RATE: 73 BPM | BODY MASS INDEX: 29.4 KG/M2 | RESPIRATION RATE: 18 BRPM | SYSTOLIC BLOOD PRESSURE: 128 MMHG | OXYGEN SATURATION: 97 %

## 2023-06-07 DIAGNOSIS — R10.9 ABDOMINAL PAIN, UNSPECIFIED ABDOMINAL LOCATION: Primary | ICD-10-CM

## 2023-06-07 DIAGNOSIS — K76.0 FATTY LIVER: ICD-10-CM

## 2023-06-07 DIAGNOSIS — I47.10 PAROXYSMAL SUPRAVENTRICULAR TACHYCARDIA (CMS/HCC): ICD-10-CM

## 2023-06-07 PROCEDURE — 3008F BODY MASS INDEX DOCD: CPT | Performed by: FAMILY MEDICINE

## 2023-06-07 PROCEDURE — 99214 OFFICE O/P EST MOD 30 MIN: CPT | Performed by: FAMILY MEDICINE

## 2023-06-07 ASSESSMENT — ENCOUNTER SYMPTOMS
ARTHRALGIAS: 0
ACTIVITY CHANGE: 0
NERVOUS/ANXIOUS: 0
CHEST TIGHTNESS: 0
ABDOMINAL DISTENTION: 1
HEADACHES: 0
TROUBLE SWALLOWING: 0
WEAKNESS: 0
BACK PAIN: 0
ANAL BLEEDING: 0
CONSTIPATION: 0
NECK PAIN: 0
DIARRHEA: 0
FATIGUE: 0
BLOOD IN STOOL: 0
SHORTNESS OF BREATH: 0
PALPITATIONS: 0
DYSURIA: 0
NAUSEA: 0
RECTAL PAIN: 0
ABDOMINAL PAIN: 1
EYE PAIN: 0
FREQUENCY: 0

## 2023-06-07 ASSESSMENT — PAIN SCALES - GENERAL: PAINLEVEL: 4

## 2023-06-08 ENCOUNTER — TRANSCRIBE ORDERS (OUTPATIENT)
Dept: SCHEDULING | Age: 63
End: 2023-06-08

## 2023-06-08 ENCOUNTER — TELEPHONE (OUTPATIENT)
Dept: PRIMARY CARE | Facility: CLINIC | Age: 63
End: 2023-06-08
Payer: COMMERCIAL

## 2023-06-08 NOTE — PROGRESS NOTES
Daily Progress Note      Subjective      Patient ID: Chano Leon is a 63 y.o. male.    HPI  Sudden onset ruq, rlq ant abd pain, assoc w/ po, assoc w/ motion  No f,c,v,n,d  No others ill  Also, For med check, diagnosis discussion  Doing well on current rx  No new c/o, compliant  Has changed diet, did have large breakfast    The following have been reviewed and updated as appropriate in this visit:   Problems       Review of Systems   Constitutional: Negative for activity change and fatigue.   HENT: Negative for congestion, ear pain, tinnitus and trouble swallowing.    Eyes: Negative for pain.   Respiratory: Negative for chest tightness and shortness of breath.    Cardiovascular: Negative for chest pain and palpitations.   Gastrointestinal: Positive for abdominal distention and abdominal pain. Negative for anal bleeding, blood in stool, constipation, diarrhea, nausea and rectal pain.   Genitourinary: Negative for dysuria, frequency and urgency.   Musculoskeletal: Negative for arthralgias, back pain and neck pain.   Neurological: Negative for weakness and headaches.   Psychiatric/Behavioral: The patient is not nervous/anxious.    All other systems reviewed and are negative.      Current Outpatient Medications   Medication Sig Dispense Refill   • aspirin (ASPIR-81 ORAL)      • FISH OIL-omega-3-vit C-vit E (COROMEGA) 2,000-650-12 mg/2.5 gram emulsion in packet Take by mouth daily.     • ipratropium (ATROVENT) 42 mcg (0.06 %) nasal spray Administer 2 sprays into each nostril 4 (four) times a day. 15 mL 12   • multivitamin tablet Take by mouth daily.     • rosuvastatin (CRESTOR) 20 mg tablet Take 1 tablet (20 mg total) by mouth daily. 90 tablet 3   • triamcinolone (KENALOG) 0.1 % cream Apply topically 2 (two) times a day for 7 days. 15 g 0     No current facility-administered medications for this visit.     Past Medical History:   Diagnosis Date   • Coronary artery disease     coronary CTA 6/23/22:  Multivessel  coronary artery disease that is mild to moderate in severity and likely nonobstructive; CACS 126   • Exercise-induced tachycardia    • Fatty liver    • Gastroenteritis    • Lipid disorder      Family History   Problem Relation Age of Onset   • Lung disease Biological Mother    • Heart disease Biological Father         CABG in his 50s;  at age 70   • COPD Biological Father    • Heart disease Biological Brother         CABG at age 58   • Hyperlipidemia Biological Brother    • Hypertension Biological Brother    • No Known Problems Biological Sister    • Cancer Maternal Grandmother          in her mid 60s   • Heart attack Maternal Grandfather         multiple heart attacks starting at age 50   • Heart disease Maternal Grandfather         CHF -  in his 70s   • No Known Problems Biological Brother    • Hypertension Biological Brother    • Diabetes Biological Brother    • Autoimmune disease Biological Sister    • Crohn's disease Biological Sister    • Autoimmune disease Biological Brother    • No Known Problems Biological Sister      Past Surgical History:   Procedure Laterality Date   • HAND SURGERY Right     50 years ago   • WISDOM TOOTH EXTRACTION       Social History     Socioeconomic History   • Marital status:      Spouse name: Not on file   • Number of children: Not on file   • Years of education: Not on file   • Highest education level: Not on file   Occupational History   • Not on file   Tobacco Use   • Smoking status: Never   • Smokeless tobacco: Never   Substance and Sexual Activity   • Alcohol use: Yes     Comment: social   • Drug use: Never   • Sexual activity: Not on file   Other Topics Concern   • Not on file   Social History Narrative   • Not on file     Social Determinants of Health     Financial Resource Strain: Not on file   Food Insecurity: No Food Insecurity (3/12/2022)    Hunger Vital Sign    • Worried About Running Out of Food in the Last Year: Never true    • Ran Out of Food in  the Last Year: Never true   Transportation Needs: Not on file   Physical Activity: Not on file   Stress: Not on file   Social Connections: Not on file   Intimate Partner Violence: Not on file   Housing Stability: Not on file     No Known Allergies    Objective   No new labs.    Physical Exam  Abdominal:      General: Abdomen is flat. Bowel sounds are normal.      Palpations: Abdomen is soft.      Tenderness: There is abdominal tenderness in the right upper quadrant and right lower quadrant. There is no right CVA tenderness, left CVA tenderness, guarding or rebound. Negative signs include Tena's sign and Rovsing's sign.      Hernia: No hernia is present.         Assessment/Plan     Problem List Items Addressed This Visit        Circulatory    Paroxysmal supraventricular tachycardia (CMS/HCC)   Other Visit Diagnoses     Abdominal pain, unspecified abdominal location    -  Primary    Relevant Orders    CT ABDOMEN PELVIS WITHOUT IV CONTRAST        Orders Placed This Encounter   Procedures   • CT ABDOMEN PELVIS WITHOUT IV CONTRAST     Standing Status:   Future     Standing Expiration Date:   6/7/2024     Order Specific Question:   Should the patient receive oral contrast?     Answer:   No     Order Specific Question:   Release to patient     Answer:   Immediate     Discussed, described, reviewed at length  Will check cat scan  Reviewed us  Reviewed us  Doubt steatosis  Po, ge, protocol  Re-check after above    Dean Steele, DO  6/7/2023

## 2023-06-08 NOTE — TELEPHONE ENCOUNTER
Py attempted tp call to schedule CT. Pt did not have scripted and asked if I could call and give script info.     Called central scheduling w/ [facility] and provided CPT, DX, and ordering physician info

## 2023-06-14 ENCOUNTER — HOSPITAL ENCOUNTER (OUTPATIENT)
Dept: RADIOLOGY | Facility: HOSPITAL | Age: 63
Discharge: HOME | End: 2023-06-14
Attending: FAMILY MEDICINE
Payer: COMMERCIAL

## 2023-06-14 DIAGNOSIS — R10.9 ABDOMINAL PAIN, UNSPECIFIED ABDOMINAL LOCATION: ICD-10-CM

## 2023-06-14 PROCEDURE — 74176 CT ABD & PELVIS W/O CONTRAST: CPT | Mod: ME

## 2023-06-15 NOTE — TELEPHONE ENCOUNTER
What Type Of Note Output Would You Prefer (Optional)?: Bullet Format
doxyy  
Is The Patient Presenting As Previously Scheduled?: Yes
How Severe Is Your Rash?: moderate
Is This A New Presentation, Or A Follow-Up?: Rash
Additional History: Patient is here to have a rash under right and left axilla areas evaluated for treatment.  She recently started on an antibiotic for another reason.  Patient with red, scaly, itchy skin in both armpits for 2 to 3 weeks. She can’t think of any reason why this would have started. She made no changes in her daily skin care routine, no changes in her razor, no changes in her shave cream, no changes in her deodorant. No other skin surfaces are affected.

## 2023-07-17 ENCOUNTER — TELEPHONE (OUTPATIENT)
Dept: SURGERY | Facility: CLINIC | Age: 63
End: 2023-07-17
Payer: COMMERCIAL

## 2023-07-19 ENCOUNTER — HOSPITAL ENCOUNTER (OUTPATIENT)
Facility: HOSPITAL | Age: 63
Setting detail: HOSPITAL OUTPATIENT SURGERY
End: 2023-07-19
Attending: SURGERY | Admitting: SURGERY
Payer: COMMERCIAL

## 2023-07-20 PROCEDURE — 45378 DIAGNOSTIC COLONOSCOPY: CPT | Performed by: SURGERY

## 2023-09-26 ENCOUNTER — DOCUMENTATION (OUTPATIENT)
Dept: PRIMARY CARE | Facility: CLINIC | Age: 63
End: 2023-09-26
Payer: COMMERCIAL

## 2023-09-26 NOTE — PROGRESS NOTES
Ju George MA has completed a chart review for Al Shaquille Leon and have determined that the care gap has been satisfied.    Care Gap Source: [facility]    Care Gap(s) Identified: Colorectal Cancer Screening    Chart Review Completed: Yes    Pt completed colonoscopy on 7/20/2023. No outreach needed.

## 2023-10-12 ENCOUNTER — TELEPHONE (OUTPATIENT)
Dept: PRIMARY CARE | Facility: CLINIC | Age: 63
End: 2023-10-12
Payer: COMMERCIAL

## 2023-10-12 RX ORDER — ROSUVASTATIN CALCIUM 10 MG/1
10 TABLET, COATED ORAL DAILY
Qty: 90 TABLET | Refills: 3 | Status: SHIPPED | OUTPATIENT
Start: 2023-10-12 | End: 2023-10-20 | Stop reason: ALTCHOICE

## 2023-10-12 NOTE — TELEPHONE ENCOUNTER
Pt statin was just renewed. Pts grandkids wasted pills by accident. Pt would need another RX sent.    rosuvastatin (CRESTOR) 20 mg tablet    Pt would also like a 10 mg instead of 20 mg RX for this since pt is cutting 20 mg tab in half anyway.

## 2023-10-20 ENCOUNTER — OFFICE VISIT (OUTPATIENT)
Dept: PRIMARY CARE | Facility: CLINIC | Age: 63
End: 2023-10-20
Payer: COMMERCIAL

## 2023-10-20 VITALS
HEIGHT: 71 IN | WEIGHT: 211 LBS | HEART RATE: 62 BPM | TEMPERATURE: 97 F | BODY MASS INDEX: 29.54 KG/M2 | RESPIRATION RATE: 18 BRPM | SYSTOLIC BLOOD PRESSURE: 130 MMHG | DIASTOLIC BLOOD PRESSURE: 72 MMHG | OXYGEN SATURATION: 97 %

## 2023-10-20 DIAGNOSIS — E78.2 MIXED HYPERLIPIDEMIA: ICD-10-CM

## 2023-10-20 DIAGNOSIS — I25.10 ASCVD (ARTERIOSCLEROTIC CARDIOVASCULAR DISEASE): Primary | ICD-10-CM

## 2023-10-20 DIAGNOSIS — H93.13 TINNITUS OF BOTH EARS: ICD-10-CM

## 2023-10-20 DIAGNOSIS — K40.90 REDUCIBLE RIGHT INGUINAL HERNIA: ICD-10-CM

## 2023-10-20 PROCEDURE — 99214 OFFICE O/P EST MOD 30 MIN: CPT | Performed by: FAMILY MEDICINE

## 2023-10-20 PROCEDURE — 3008F BODY MASS INDEX DOCD: CPT | Performed by: FAMILY MEDICINE

## 2023-10-20 ASSESSMENT — ENCOUNTER SYMPTOMS
FREQUENCY: 0
DYSURIA: 0
NECK PAIN: 0
EYE PAIN: 0
HEADACHES: 0
NAUSEA: 0
DIARRHEA: 0
PALPITATIONS: 0
ABDOMINAL PAIN: 0
BLOOD IN STOOL: 0
ABDOMINAL DISTENTION: 1
TROUBLE SWALLOWING: 0
ACTIVITY CHANGE: 0
NERVOUS/ANXIOUS: 0
ARTHRALGIAS: 0
FATIGUE: 0
BACK PAIN: 0
SHORTNESS OF BREATH: 0
WEAKNESS: 0
CHEST TIGHTNESS: 0

## 2023-10-21 LAB
ALBUMIN SERPL-MCNC: 4.6 G/DL (ref 3.9–4.9)
ALBUMIN/GLOB SERPL: 2.6 {RATIO} (ref 1.2–2.2)
ALP SERPL-CCNC: 70 IU/L (ref 44–121)
ALT SERPL-CCNC: 33 IU/L (ref 0–44)
AST SERPL-CCNC: 25 IU/L (ref 0–40)
BILIRUB SERPL-MCNC: 0.5 MG/DL (ref 0–1.2)
BUN SERPL-MCNC: 18 MG/DL (ref 8–27)
BUN/CREAT SERPL: 21 (ref 10–24)
CALCIUM SERPL-MCNC: 9.2 MG/DL (ref 8.6–10.2)
CHLORIDE SERPL-SCNC: 107 MMOL/L (ref 96–106)
CHOLEST SERPL-MCNC: 221 MG/DL (ref 100–199)
CO2 SERPL-SCNC: 21 MMOL/L (ref 20–29)
CREAT SERPL-MCNC: 0.85 MG/DL (ref 0.76–1.27)
EGFRCR SERPLBLD CKD-EPI 2021: 97 ML/MIN/1.73
GLOBULIN SER CALC-MCNC: 1.8 G/DL (ref 1.5–4.5)
GLUCOSE SERPL-MCNC: 89 MG/DL (ref 70–99)
HDLC SERPL-MCNC: 48 MG/DL
LDLC SERPL CALC-MCNC: 160 MG/DL (ref 0–99)
POTASSIUM SERPL-SCNC: 4.5 MMOL/L (ref 3.5–5.2)
PROT SERPL-MCNC: 6.4 G/DL (ref 6–8.5)
SODIUM SERPL-SCNC: 145 MMOL/L (ref 134–144)
SPECIMEN STATUS: NORMAL
TRIGL SERPL-MCNC: 74 MG/DL (ref 0–149)
VLDLC SERPL CALC-MCNC: 13 MG/DL (ref 5–40)

## 2023-10-21 NOTE — PROGRESS NOTES
Daily Progress Note      Subjective      Patient ID: Chano Leon is a 63 y.o. male.    HPI  For med check, diagnosis discussion  Doing well on current rx  No new c/o, compliant  For review/discussion of statin, chol, ascvd  Also, for review/discussion hernia repair  Also, discussion re tinnitus    The following have been reviewed and updated as appropriate in this visit:   Problems       Review of Systems   Constitutional: Negative for activity change and fatigue.   HENT: Positive for hearing loss and tinnitus. Negative for congestion, ear pain and trouble swallowing.    Eyes: Negative for pain.   Respiratory: Negative for chest tightness and shortness of breath.    Cardiovascular: Negative for chest pain and palpitations.   Gastrointestinal: Positive for abdominal distention. Negative for abdominal pain, blood in stool, diarrhea and nausea.   Genitourinary: Negative for dysuria, frequency and urgency.   Musculoskeletal: Negative for arthralgias, back pain and neck pain.   Neurological: Negative for weakness and headaches.   Psychiatric/Behavioral: The patient is not nervous/anxious.    All other systems reviewed and are negative.      Current Outpatient Medications   Medication Sig Dispense Refill    FISH OIL-omega-3-vit C-vit E (COROMEGA) 2,000-650-12 mg/2.5 gram emulsion in packet Take by mouth daily.      ipratropium (ATROVENT) 42 mcg (0.06 %) nasal spray Administer 2 sprays into each nostril 4 (four) times a day. 15 mL 12    multivitamin tablet Take by mouth daily.      triamcinolone (KENALOG) 0.1 % cream Apply topically 2 (two) times a day for 7 days. 15 g 0     No current facility-administered medications for this visit.     Past Medical History:   Diagnosis Date    Coronary artery disease     coronary CTA 6/23/22:  Multivessel coronary artery disease that is mild to moderate in severity and likely nonobstructive; CACS 126    Exercise-induced tachycardia     Fatty liver     Gastroenteritis       Family History   Problem Relation Age of Onset    Lung disease Biological Mother     Heart disease Biological Father         CABG in his 50s;  at age 70    COPD Biological Father     Heart disease Biological Brother         CABG at age 58    Hyperlipidemia Biological Brother     Hypertension Biological Brother     No Known Problems Biological Sister     Cancer Maternal Grandmother          in her mid 60s    Heart attack Maternal Grandfather         multiple heart attacks starting at age 50    Heart disease Maternal Grandfather         CHF -  in his 70s    No Known Problems Biological Brother     Hypertension Biological Brother     Diabetes Biological Brother     Autoimmune disease Biological Sister     Crohn's disease Biological Sister     Autoimmune disease Biological Brother     No Known Problems Biological Sister      Past Surgical History:   Procedure Laterality Date    HAND SURGERY Right     50 years ago    WISDOM TOOTH EXTRACTION       Social History     Socioeconomic History    Marital status:      Spouse name: Not on file    Number of children: Not on file    Years of education: Not on file    Highest education level: Not on file   Occupational History    Not on file   Tobacco Use    Smoking status: Never     Passive exposure: Never    Smokeless tobacco: Never   Vaping Use    Vaping Use: Never used   Substance and Sexual Activity    Alcohol use: Yes     Comment: social    Drug use: Never    Sexual activity: Yes     Partners: Female     Birth control/protection: None   Other Topics Concern    Not on file   Social History Narrative    Not on file     Social Determinants of Health     Financial Resource Strain: Not on file   Food Insecurity: No Food Insecurity (3/12/2022)    Hunger Vital Sign     Worried About Running Out of Food in the Last Year: Never true     Ran Out of Food in the Last Year: Never true   Transportation Needs: Not on file   Physical  Activity: Not on file   Stress: Not on file   Social Connections: Not on file   Intimate Partner Violence: Not on file   Housing Stability: Not on file     No Known Allergies    Objective   I have reviewed the patient's pertinent labs. Pertinent labs are within normal limits.    Physical Exam  Constitutional:       Appearance: Normal appearance. He is well-developed.   HENT:      Head: Normocephalic and atraumatic.      Right Ear: Tympanic membrane, ear canal and external ear normal.      Left Ear: Tympanic membrane, ear canal and external ear normal.   Eyes:      Pupils: Pupils are equal, round, and reactive to light.   Neck:      Thyroid: No thyromegaly.   Cardiovascular:      Rate and Rhythm: Normal rate and regular rhythm.      Heart sounds: Normal heart sounds.   Pulmonary:      Effort: Pulmonary effort is normal. No respiratory distress.      Breath sounds: Normal breath sounds. No wheezing or rales.   Abdominal:      General: Bowel sounds are normal.      Palpations: Abdomen is soft. There is no mass.      Tenderness: There is no abdominal tenderness. There is no guarding or rebound.      Hernia: A hernia is present. Hernia is present in the left inguinal area.   Musculoskeletal:         General: No deformity. Normal range of motion.      Cervical back: Normal range of motion and neck supple.   Skin:     General: Skin is warm and dry.   Neurological:      Mental Status: He is alert and oriented to person, place, and time.      Cranial Nerves: No cranial nerve deficit.   Psychiatric:         Behavior: Behavior normal.         Thought Content: Thought content normal.         Judgment: Judgment normal.         Assessment/Plan     Problem List Items Addressed This Visit        Digestive    Reducible right inguinal hernia       Other    Mixed hyperlipidemia   Other Visit Diagnoses     ASCVD (arteriosclerotic cardiovascular disease)    -  Primary    Relevant Orders    Lipid panel    Comprehensive metabolic panel     Tinnitus of both ears            Orders Placed This Encounter   Procedures    Lipid panel     Standing Status:   Future     Standing Expiration Date:   10/20/2024     Order Specific Question:   Release to patient     Answer:   Immediate [1]    Comprehensive metabolic panel     Standing Status:   Future     Standing Expiration Date:   10/20/2024     Order Specific Question:   Release to patient     Answer:   Immediate [1]     Wants to not use statin  Will check labs  T/c non-statin tx  Reviewed options  T/c cholest-off  Reviewed sx, options, grewal, mesh  reviuewed tinnitus, will follow  No ent now  Discussed valeandro Steele,   10/20/2023

## 2023-11-06 ENCOUNTER — CONSULT (OUTPATIENT)
Dept: PRIMARY CARE | Facility: CLINIC | Age: 63
End: 2023-11-06
Payer: COMMERCIAL

## 2023-11-06 VITALS
SYSTOLIC BLOOD PRESSURE: 130 MMHG | HEIGHT: 71 IN | DIASTOLIC BLOOD PRESSURE: 78 MMHG | TEMPERATURE: 97.9 F | OXYGEN SATURATION: 94 % | HEART RATE: 81 BPM | BODY MASS INDEX: 30.24 KG/M2 | RESPIRATION RATE: 18 BRPM | WEIGHT: 216 LBS

## 2023-11-06 DIAGNOSIS — Z01.818 PRE-OP TESTING: Primary | ICD-10-CM

## 2023-11-06 PROCEDURE — 3008F BODY MASS INDEX DOCD: CPT | Performed by: FAMILY MEDICINE

## 2023-11-06 PROCEDURE — 99214 OFFICE O/P EST MOD 30 MIN: CPT | Performed by: FAMILY MEDICINE

## 2023-11-06 PROCEDURE — 93000 ELECTROCARDIOGRAM COMPLETE: CPT | Performed by: FAMILY MEDICINE

## 2023-11-06 ASSESSMENT — ENCOUNTER SYMPTOMS
DIARRHEA: 0
EYE PAIN: 0
NAUSEA: 0
BACK PAIN: 0
ABDOMINAL DISTENTION: 1
NECK PAIN: 0
PALPITATIONS: 0
ABDOMINAL PAIN: 0
ACTIVITY CHANGE: 0
NERVOUS/ANXIOUS: 0
ARTHRALGIAS: 0
TROUBLE SWALLOWING: 0
BLOOD IN STOOL: 0
DYSURIA: 0
CHEST TIGHTNESS: 0
FREQUENCY: 0
FATIGUE: 0
WEAKNESS: 0
SHORTNESS OF BREATH: 0
HEADACHES: 0

## 2023-11-06 NOTE — PROGRESS NOTES
Daily Progress Note      Subjective      Patient ID: Chano Leon is a 63 y.o. male.    HPI  For pre-op pe for hernia repai  Also, discussion re ascvd risk, chol rx    The following have been reviewed and updated as appropriate in this visit:   Problems       Review of Systems   Constitutional: Negative for activity change and fatigue.   HENT: Negative for congestion, ear pain, tinnitus and trouble swallowing.    Eyes: Negative for pain.   Respiratory: Negative for chest tightness and shortness of breath.    Cardiovascular: Negative for chest pain and palpitations.   Gastrointestinal: Positive for abdominal distention. Negative for abdominal pain, blood in stool, diarrhea and nausea.   Genitourinary: Negative for dysuria, frequency and urgency.   Musculoskeletal: Negative for arthralgias, back pain and neck pain.   Neurological: Negative for weakness and headaches.   Psychiatric/Behavioral: The patient is not nervous/anxious.    All other systems reviewed and are negative.      Current Outpatient Medications   Medication Sig Dispense Refill    FISH OIL-omega-3-vit C-vit E (COROMEGA) 2,000-650-12 mg/2.5 gram emulsion in packet Take by mouth daily.      ipratropium (ATROVENT) 42 mcg (0.06 %) nasal spray Administer 2 sprays into each nostril 4 (four) times a day. 15 mL 12    multivitamin tablet Take by mouth daily.      triamcinolone (KENALOG) 0.1 % cream Apply topically 2 (two) times a day for 7 days. 15 g 0     No current facility-administered medications for this visit.     Past Medical History:   Diagnosis Date    Coronary artery disease     coronary CTA 22:  Multivessel coronary artery disease that is mild to moderate in severity and likely nonobstructive; CACS 126    Exercise-induced tachycardia     Fatty liver     Gastroenteritis      Family History   Problem Relation Age of Onset    Lung disease Biological Mother     Heart disease Biological Father         CABG in his 50s;  at age  70    COPD Biological Father     Heart disease Biological Brother         CABG at age 58    Hyperlipidemia Biological Brother     Hypertension Biological Brother     No Known Problems Biological Sister     Cancer Maternal Grandmother          in her mid 60s    Heart attack Maternal Grandfather         multiple heart attacks starting at age 50    Heart disease Maternal Grandfather         CHF -  in his 70s    No Known Problems Biological Brother     Hypertension Biological Brother     Diabetes Biological Brother     Autoimmune disease Biological Sister     Crohn's disease Biological Sister     Autoimmune disease Biological Brother     No Known Problems Biological Sister      Past Surgical History:   Procedure Laterality Date    HAND SURGERY Right     50 years ago    WISDOM TOOTH EXTRACTION       Social History     Socioeconomic History    Marital status:      Spouse name: Not on file    Number of children: Not on file    Years of education: Not on file    Highest education level: Not on file   Occupational History    Not on file   Tobacco Use    Smoking status: Never     Passive exposure: Never    Smokeless tobacco: Never   Vaping Use    Vaping Use: Never used   Substance and Sexual Activity    Alcohol use: Yes     Comment: social    Drug use: Never    Sexual activity: Yes     Partners: Female     Birth control/protection: None   Other Topics Concern    Not on file   Social History Narrative    Not on file     Social Determinants of Health     Financial Resource Strain: Not on file   Food Insecurity: No Food Insecurity (3/12/2022)    Hunger Vital Sign     Worried About Running Out of Food in the Last Year: Never true     Ran Out of Food in the Last Year: Never true   Transportation Needs: Not on file   Physical Activity: Not on file   Stress: Not on file   Social Connections: Not on file   Intimate Partner Violence: Not on file   Housing Stability: Not on file     No  Known Allergies    Objective   I have reviewed the patient's pertinent labs.  Significant abnormals are ldl.    Physical Exam  Constitutional:       Appearance: Normal appearance. He is well-developed.   HENT:      Head: Normocephalic and atraumatic.      Right Ear: External ear normal.      Left Ear: External ear normal.   Eyes:      Pupils: Pupils are equal, round, and reactive to light.   Neck:      Thyroid: No thyromegaly.   Cardiovascular:      Rate and Rhythm: Normal rate and regular rhythm.      Heart sounds: Normal heart sounds.   Pulmonary:      Effort: Pulmonary effort is normal. No respiratory distress.      Breath sounds: Normal breath sounds. No wheezing or rales.   Abdominal:      General: Bowel sounds are normal.      Palpations: Abdomen is soft. There is no mass.      Tenderness: There is no abdominal tenderness. There is no guarding or rebound.      Hernia: A hernia is present. Hernia is present in the left inguinal area and right inguinal area.   Musculoskeletal:         General: No deformity. Normal range of motion.      Cervical back: Normal range of motion and neck supple.   Skin:     General: Skin is warm and dry.   Neurological:      Mental Status: He is alert and oriented to person, place, and time.      Cranial Nerves: No cranial nerve deficit.   Psychiatric:         Behavior: Behavior normal.         Thought Content: Thought content normal.         Judgment: Judgment normal.         Assessment/Plan     Problem List Items Addressed This Visit    None  Visit Diagnoses     Pre-op testing    -  Primary    Relevant Orders    ECG 12 LEAD OFFICE PERFORMED (Completed)        Orders Placed This Encounter   Procedures    ECG 12 LEAD OFFICE PERFORMED     Scheduling Instructions:      PLEASE USE THIS ORDER FOR ECG'S PERFORMED IN PHYSICIAN OFFICES     Order Specific Question:   Release to patient     Answer:   Immediate [1]     EKG reviewed  Khan class 1 intra-op risk  Diet, ex, safety reviewed    stable on rx, c/w same  Reviewed pre-op, sx, etc  Meets w/ [facility]  Reviewed lipid, meds, repatha, cario etc    Dean Steele DO  11/6/2023

## 2023-11-08 ENCOUNTER — APPOINTMENT (OUTPATIENT)
Dept: LAB | Facility: HOSPITAL | Age: 63
End: 2023-11-08
Attending: SURGERY
Payer: COMMERCIAL

## 2023-11-08 ENCOUNTER — APPOINTMENT (OUTPATIENT)
Dept: PREADMISSION TESTING | Facility: HOSPITAL | Age: 63
End: 2023-11-08
Attending: SURGERY
Payer: COMMERCIAL

## 2023-11-08 VITALS
DIASTOLIC BLOOD PRESSURE: 80 MMHG | RESPIRATION RATE: 18 BRPM | WEIGHT: 214 LBS | TEMPERATURE: 98.4 F | HEIGHT: 72 IN | OXYGEN SATURATION: 95 % | BODY MASS INDEX: 28.99 KG/M2 | HEART RATE: 67 BPM | SYSTOLIC BLOOD PRESSURE: 136 MMHG

## 2023-11-08 DIAGNOSIS — K40.90 REDUCIBLE RIGHT INGUINAL HERNIA: ICD-10-CM

## 2023-11-08 LAB
ANION GAP SERPL CALC-SCNC: 8 MEQ/L (ref 3–15)
BASOPHILS # BLD: 0.03 K/UL (ref 0.01–0.1)
BASOPHILS NFR BLD: 0.6 %
BUN SERPL-MCNC: 21 MG/DL (ref 7–25)
CALCIUM SERPL-MCNC: 9.3 MG/DL (ref 8.6–10.3)
CHLORIDE SERPL-SCNC: 107 MEQ/L (ref 98–107)
CO2 SERPL-SCNC: 25 MEQ/L (ref 21–31)
CREAT SERPL-MCNC: 0.9 MG/DL (ref 0.7–1.3)
DIFFERENTIAL METHOD BLD: NORMAL
EOSINOPHIL # BLD: 0.07 K/UL (ref 0.04–0.54)
EOSINOPHIL NFR BLD: 1.4 %
ERYTHROCYTE [DISTWIDTH] IN BLOOD BY AUTOMATED COUNT: 13.5 % (ref 11.6–14.4)
GFR SERPL CREATININE-BSD FRML MDRD: >60 ML/MIN/1.73M*2
GLUCOSE SERPL-MCNC: 121 MG/DL (ref 70–99)
HCT VFR BLDCO AUTO: 47 % (ref 40.1–51)
HGB BLD-MCNC: 15.8 G/DL (ref 13.7–17.5)
IMM GRANULOCYTES # BLD AUTO: 0.02 K/UL (ref 0–0.08)
IMM GRANULOCYTES NFR BLD AUTO: 0.4 %
LYMPHOCYTES # BLD: 1.66 K/UL (ref 1.2–3.5)
LYMPHOCYTES NFR BLD: 33.7 %
MCH RBC QN AUTO: 31.1 PG (ref 28–33.2)
MCHC RBC AUTO-ENTMCNC: 33.6 G/DL (ref 32.2–36.5)
MCV RBC AUTO: 92.5 FL (ref 83–98)
MONOCYTES # BLD: 0.39 K/UL (ref 0.3–1)
MONOCYTES NFR BLD: 7.9 %
NEUTROPHILS # BLD: 2.75 K/UL (ref 1.7–7)
NEUTS SEG NFR BLD: 56 %
NRBC BLD-RTO: 0 %
PDW BLD AUTO: 10.3 FL (ref 9.4–12.4)
PLATELET # BLD AUTO: 176 K/UL (ref 150–350)
POTASSIUM SERPL-SCNC: 4.3 MEQ/L (ref 3.5–5.1)
RBC # BLD AUTO: 5.08 M/UL (ref 4.5–5.8)
SODIUM SERPL-SCNC: 140 MEQ/L (ref 136–145)
WBC # BLD AUTO: 4.92 K/UL (ref 3.8–10.5)

## 2023-11-08 PROCEDURE — 85025 COMPLETE CBC W/AUTO DIFF WBC: CPT

## 2023-11-08 PROCEDURE — 80048 BASIC METABOLIC PNL TOTAL CA: CPT

## 2023-11-08 PROCEDURE — 36415 COLL VENOUS BLD VENIPUNCTURE: CPT

## 2023-11-08 RX ORDER — PSYLLIUM HUSK 0.4 G
200 CAPSULE ORAL EVERY EVENING
COMMUNITY

## 2023-11-08 ASSESSMENT — PAIN SCALES - GENERAL: PAINLEVEL: 0-NO PAIN

## 2023-11-08 NOTE — H&P
Preadmission Testing-  Pre-Operative H&P       Patient Name: Chano Leon  Referring Surgeon: Dr. Stock    Reason for Referral: Pre-Operative Evaluation  Surgical Procedure: HERNIA INGUINAL LAPAROSCOPIC ROBOTIC  Operative Date: 11/29/2023     PCP: Dean Steele DO        HISTORY OF PRESENT ILLNESS      Chano Leon is a 63 y.o. male presenting today to the [facility] at [facility] for pre-operative evaluation prior to planned surgery.    Pt reports he was dx'ed with a hernia about 6 years ago by his PCP.  He works out routinely.  He reports soreness at groin and belly button after strenuous exercise recently.  He denies change in bowel habits.  He is s/p routine colonoscopy as recommended Dr. Stock   Plan for HERNIA INGUINAL LAPAROSCOPIC ROBOTIC with Dr. Stock on 11/29/2023.    (see below in regards to medical history)    The patient denies any current or recent chest pain or pressure, dyspnea, cough, sputum, fevers, chills, nausea, vomiting, diarrhea or other symptoms.     Functionally, the patient is able to ascend a flight or so of stairs with no dyspnea or chest pain. He/she can also walk a couple blocks without issue. He exercises in the pool 3-4x/ week. Functional capacity > 4 METS.    The patient reports, on specific questioning, the following:  +H/O paroxysmal SVT.  No history of MI, valvular disease, or CHF.  No history of ROCHELLE - testing negative per pt.  No history of DVT/PE.  No history of COPD.  No history of CVA.  No history of DM.   No history of CKD.   +H/O fatty liver.  No history of bleeding disorder.  No history of difficult airway/difficult intubation.  No history of Malignant hyperthermia.  No history of adverse reaction to anesthesia in the past.    PAST MEDICAL AND SURGICAL HISTORY      Past Medical History:   Diagnosis Date    Coronary artery disease     coronary CTA 6/23/22:  Multivessel coronary artery disease that  is mild to moderate in severity and likely nonobstructive; CACS 126    COVID-19     2021, 2022    Exercise-induced tachycardia     Fatty liver     Gastroenteritis        Past Surgical History:   Procedure Laterality Date    HAND SURGERY Right     50 years ago    KNEE ARTHROSCOPY Right 2022    meniscal and tendon repair    WISDOM TOOTH EXTRACTION         MEDICATIONS        Current Outpatient Medications:     MAGNESIUM CHLORIDE ORAL, Take 1,400 mg by mouth every morning., Disp: , Rfl:     NON FORMULARY MEDICATION REQUEST, Omega 3/fish oil 1000mg 1 capsule po BID, Disp: , Rfl:     NON FORMULARY MEDICATION REQUEST, Astaxanthin 1 tablet po each evening, Disp: , Rfl:     NON FORMULARY MEDICATION REQUEST, Trianex 0.05% apply to skin daily prn, Disp: , Rfl:     NON FORMULARY MEDICATION REQUEST, Cholestoff plus 1 tab po BID 900mg, Disp: , Rfl:     selenium 200 mcg capsule, Take 200 mcg by mouth every evening., Disp: , Rfl:     multivitamin tablet, Take 1 tablet by mouth every morning., Disp: , Rfl:     ALLERGIES      Patient has no known allergies.    FAMILY HISTORY      family history includes Autoimmune disease in his biological brother and biological sister; COPD in his biological father; Cancer in his maternal grandmother; Crohn's disease in his biological sister; Diabetes in his biological brother; Heart attack in his maternal grandfather; Heart disease in his biological brother, biological father, and maternal grandfather; Hyperlipidemia in his biological brother; Hypertension in his biological brother and biological brother; Lung disease in his biological mother; No Known Problems in his biological brother, biological sister, and biological sister.    Denies any prior known family history of DVTs/PEs/clotting disorder    SOCIAL HISTORY      Social History     Tobacco Use    Smoking status: Never     Passive exposure: Never    Smokeless tobacco: Never   Vaping Use    Vaping Use: Never used   Substance  Use Topics    Alcohol use: Yes     Comment: 2 drinks weekly    Drug use: Never       REVIEW OF SYSTEMS      Constitution: Negative for decreased appetite, diaphoresis, fever, malaise/fatigue, weight gain and weight loss.   HENT: +hearing loss and tinnitus.  Eyes: +wears reading glasses.    Cardiovascular: Negative for chest pain, dyspnea on exertion, irregular heartbeat, leg swelling, near-syncope, orthopnea, palpitations, paroxysmal nocturnal dyspnea and syncope.   Respiratory: Negative for cough, hemoptysis, shortness of breath, sleep disturbances due to breathing and snoring.    Hematologic/Lymphatic: Does not bruise/bleed easily.   Skin: +B/L groin rash - Dr. Steele, PCP, recommended OTC cream.  +H/O eczema - managed with OTC cream.  Musculoskeletal: +s/p right knee scope, with intermittent knee stiffness   Gastrointestinal: +acid reflux, resolved with limiting night time eating.    Negative for hematemesis, hematochezia and melena.   Genitourinary: Negative for hematuria.   Neurological: Negative for dizziness and light-headedness.   Psychiatric/Behavioral: Negative for altered mental status.    All other systems reviewed and negative except as noted in HPI    PHYSICAL EXAMINATION      Visit Vitals  /80   Pulse 67   Temp 36.9 °C (98.4 °F) (Oral)   Resp 18   Ht 1.829 m (6')   Wt 97.1 kg (214 lb)   SpO2 95%   BMI 29.02 kg/m²     Body mass index is 29.02 kg/m².    Physical Exam  Vitals reviewed.   Constitutional:       Appearance: Normal appearance. He is well-developed.   HENT:      Head: Normocephalic.   Eyes:      Pupils: Pupils are equal, round, and reactive to light.   Cardiovascular:      Rate and Rhythm: Normal rate and regular rhythm.      Pulses: Normal pulses.      Heart sounds: Normal heart sounds.   Pulmonary:      Effort: Pulmonary effort is normal. No respiratory distress.      Breath sounds: Normal breath sounds.   Abdominal:      General: Bowel sounds are normal. There is no distension.       Palpations: Abdomen is soft.      Tenderness: There is no abdominal tenderness.   Genitourinary:     Comments: Exam deferred  Musculoskeletal:         General: Normal range of motion.      Cervical back: Normal range of motion.   Skin:     General: Skin is warm and dry.   Neurological:      Mental Status: He is alert and oriented to person, place, and time.   Psychiatric:         Behavior: Behavior normal.         LABS / EKG        Labs  Lab Results   Component Value Date    WBC 7.38 03/12/2022    HGB 14.6 03/12/2022    HCT 44.8 03/12/2022    MCV 95.3 03/12/2022     03/12/2022     Lab Results   Component Value Date    GLUCOSE 89 10/20/2023    CALCIUM 9.5 02/28/2023     (H) 10/20/2023    K 4.5 10/20/2023    CO2 21 10/20/2023     (H) 10/20/2023    BUN 18 10/20/2023    CREATININE 0.85 10/20/2023           ECG/Telemetry  11/6/2023 at PCP  Sinus rhythm  Low voltage in limb leads   Rate 75 bpm    ASSESSMENT AND PLAN         Dx: Reducible right inguinal hernia  Plan for HERNIA INGUINAL LAPAROSCOPIC ROBOTIC with Dr. Stock on 11/29/2023   - pre-op instructions, including medications, reviewed with pt who verbalized understanding and asked appropriate questions     > pt instructed to stop Omega 3/ Fish Oil, Astaxanthin, and Selenium 1 week prior to surgery - last dose on 11/21/2023     - pre-op labs ordered and pending   - pt instructed to call Dr. Stock  with any questions or concerns    H/O HPL   - reports recent LDL elevated   - monitoring per Dr. Steele, PCP   - pt instructed to hold Cholestoff 1 week prior to surgery - last dose on 11/21/2023     H/O CAD   - with family history of cardiac disease   - reports recent coronary calcium score only mildly elevated    - continue management as per Dr. Steele    H/O paroxsymal SVT   - reports he was seen 20 years ago at [facility] [place], dx'ed as exercise-induced arrhyhtmia   - no recurrence     H/O fatty liver   - pt attributes it to severe food poisoning    - he believes  liver labs have stabilized    - LFTs WNL on 10/20/2023 labs     H/O B/L hearing loss and tinnitus   - does not use hearing aids   - attributes tinnitus to OTC cough medicine he was taking for chronic cough s/p COVID   - continue supportive care      B/L groin rash    - Dr. Steele, PCP, recommended OTC cream    - educated pt on importance of D/W Dr. Steele if rash does not improve within the next few days so he can get treatment prior to surgery     Pre-op medical clearance   - pt seen by Dr. Steele, PCP, on 11/6/2023 (note reviewed in EPIC)       > EKG reviewed      > Khan class 1 intra-op risk      > Reviewed pre-op, sx, etc       In regards to perioperative cardiac risk:  The patient denies any history of ischemic heart disease, denies any history of CHF, denies any history of CVA, is not on pre-operative treatment with insulin, and does not have a pre-operative creatinine > 2 mg/dL.   The Revised Cardiac Risk Index (RCRI) = 0 for this patient which indicates a 0.4% risk of major adverse cardiac event in the perioperative period for this intermediate risk procedure.     Further comments:  Resume supplements when OK with surgical team.  I would encourage incentive spirometry to assist with minimizing kita-operative pulmonary risk.  DVT prophylaxis and timing of such per the discretion of the surgeon.     Further comments:  STOP-BANG screening for obstructive sleep apnea = 3, which indicates the patient is at high risk for having underlying ROCHELLE.   Sleep apnea places the patient at relatively increased risk (compared to a population without this diagnosis) of oxygen desaturation, cardiac events, acute respiratory failure, or an ICU transfer.      In the post op period: recommend use of our sleep apnea protocol in the PACU, close monitoring, pulse ox monitoring, consider extubate to BIPAP; pulmonary consultation if necessary.  Reduce narcotic medication dosage as much as possible.          Moon Lu,  NOLBERTO  11/8/2023

## 2023-11-08 NOTE — PRE-PROCEDURE INSTRUCTIONS
1. You will be called between 1pm-5pm one business day before your procedure to tell you where and when to report. If you do not receive a phone call by 6pm, please call the Operating Room at [phone].    2. Please report to Surgery Registration on the day of your procedure. You can park in the [facility], enter the front doors of the new Pav[facility], and take the [facility] elevators to the second floor. Follow signs to Surgery Registration.       3. Please follow the following fasting guidelines:     No solid food EIGHT HOURS prior to arrival time on day of surgery.  Unlimited clear liquids, meaning water or PLAIN black coffee WITHOUT any milk, cream, sugar, or sweetener are permitted up to TWO HOURS prior to arrival at the hospital.    4. Please take ONLY the following medications with a sip of water on the morning of your procedure: (populate names and/or NONE)  *Take no medications the morning of surgery    *Stop taking Omega 3/ Fish Oil, Astaxanthin, Cholestoff, and Selenium 1 week prior to surgery - last dose on 11/21/2023    5. Other Instructions: You may brush your teeth the morning of the procedure. Rinse and spit, do not swallow.  Bring a list of your medications with dosages.  Use surgical wash as directed.   *Shower with Chlorhexidine soap the night before and the morning of surgery as instructed    6. If you develop a cold, cough, fever, rash, or other symptom prior to the day of the procedure, please report it to your physician immediately.    7. If you need to cancel the procedure for any reason, please contact your physician.    8. Make arrangements to have safe transportation home accompanied by a responsible adult. If you have not arranged safe transportation home, your surgery will be cancelled. Safe transportation may include private vehicle, ride-share service, taxi and public transportation when accompanied by a responsible adult who will assist you home. A responsible adult is someone known to  you and does not include the taxi, ride-share or public transit drive transporting you.    9.  If it is medically necessary for you to have a longer stay, you will be informed as soon as the decision is made.    10. Only bring essential items to the hospital.  Do not wear or bring anything of value to the hospital including jewelry of any kind, money, or wallet. Do not wear make-up or contact lenses.  DO NOT BRING MEDICATIONS FROM HOME unless instructed to do so. DO bring your hearing aids, glasses, and a case    11. No lotion, creams, powders, or oils on skin the morning of procedure     12. Dress in comfortable clothes.    13.  If instructed, please bring a copy of your Advanced Directive (Living Will/Durable Power of ) on the day of your procedure.     14. Ensuring your safety at all times is a very important part of our [facility] Culture of Safety. After having surgery and sedation, you are at risk for falling and balance issues. Although you may feel awake, the effects of the medication can last up to 24 hours after anesthesia. If you need to use the bathroom during your recovery period, nursing staff will escort you there and stay with you to ensure your safety.    15. Refrain from drinking alcohol and smoking cigarettes for 24 hours prior to surgery.    16. Shower with antibacterial soap (Dial) the night before and morning of your procedure.  If your procedure indicates the need for CHG antiseptic wash (Bactoshield or Hibiclens), please use this instead and follow instructions as discussed at the time of your Pre-Admission Testing phone interview or visit.    Above instructions reviewed with patient and patient acknowledges understanding.    Form explained by: NOLBERTO Hnadley

## 2023-11-14 ENCOUNTER — TELEPHONE (OUTPATIENT)
Dept: SURGERY | Facility: CLINIC | Age: 63
End: 2023-11-14
Payer: COMMERCIAL

## 2023-11-14 NOTE — TELEPHONE ENCOUNTER
Al:  [phone]    Patient is calling to speak with Dr Stock believes there may be another hernia close to his Umbilical area which was seen on Ct.  Area is  hard in the area of his belly button. Just wondering if both hernias will be fixed during his scheduled procedure      Requesting a call back from Dr Stock

## 2023-11-27 ENCOUNTER — TELEPHONE (OUTPATIENT)
Dept: SURGERY | Facility: CLINIC | Age: 63
End: 2023-11-27
Payer: COMMERCIAL

## 2023-11-27 NOTE — TELEPHONE ENCOUNTER
Al: [phone]    Patient is calling to speak with Dr Stock believes there may be another hernia close to his Umbilical area which was seen on Ct.  Area is  hard in the area of his belly button. Just wondering if both hernias will be fixed during his scheduled procedure       Requesting a call back from Dr Stock

## 2023-12-22 ENCOUNTER — OFFICE VISIT (OUTPATIENT)
Dept: PRIMARY CARE | Facility: CLINIC | Age: 63
End: 2023-12-22
Payer: COMMERCIAL

## 2023-12-22 VITALS
DIASTOLIC BLOOD PRESSURE: 88 MMHG | WEIGHT: 212.4 LBS | OXYGEN SATURATION: 98 % | HEART RATE: 73 BPM | SYSTOLIC BLOOD PRESSURE: 132 MMHG | TEMPERATURE: 98 F | BODY MASS INDEX: 28.81 KG/M2

## 2023-12-22 DIAGNOSIS — J01.11 ACUTE RECURRENT FRONTAL SINUSITIS: Primary | ICD-10-CM

## 2023-12-22 PROCEDURE — 99213 OFFICE O/P EST LOW 20 MIN: CPT | Performed by: FAMILY MEDICINE

## 2023-12-22 PROCEDURE — 3008F BODY MASS INDEX DOCD: CPT | Performed by: FAMILY MEDICINE

## 2023-12-22 RX ORDER — DOXYCYCLINE HYCLATE 100 MG
100 TABLET ORAL 2 TIMES DAILY
Qty: 20 TABLET | Refills: 0 | Status: SHIPPED | OUTPATIENT
Start: 2023-12-22 | End: 2024-01-01

## 2023-12-22 RX ORDER — PREDNISONE 10 MG/1
10 TABLET ORAL 4 TIMES DAILY
Qty: 16 TABLET | Refills: 0 | Status: SHIPPED | OUTPATIENT
Start: 2023-12-22 | End: 2024-03-05

## 2023-12-22 ASSESSMENT — ENCOUNTER SYMPTOMS
HEADACHES: 0
SINUS PAIN: 1
FREQUENCY: 0
VOICE CHANGE: 1
SINUS PRESSURE: 1
DYSURIA: 0
COUGH: 1
CHEST TIGHTNESS: 0
ABDOMINAL PAIN: 0
NAUSEA: 0
SHORTNESS OF BREATH: 0
TROUBLE SWALLOWING: 0
BLOOD IN STOOL: 0
ARTHRALGIAS: 0
ACTIVITY CHANGE: 1
DIARRHEA: 0
WEAKNESS: 0
FATIGUE: 1
NERVOUS/ANXIOUS: 0
BACK PAIN: 0
EYE PAIN: 0
PALPITATIONS: 0
NECK PAIN: 0

## 2023-12-22 NOTE — PROGRESS NOTES
Daily Progress Note      Subjective      Patient ID: Chano Leon is a 63 y.o. male.    HPI  Cough, congestion, pnd, voice change, redd progressive x 5-7 d  covid neg    The following have been reviewed and updated as appropriate in this visit:        Review of Systems   Constitutional: Positive for activity change and fatigue.   HENT: Positive for congestion, sinus pressure, sinus pain and voice change. Negative for ear pain, tinnitus and trouble swallowing.    Eyes: Negative for pain.   Respiratory: Positive for cough. Negative for chest tightness and shortness of breath.    Cardiovascular: Negative for chest pain and palpitations.   Gastrointestinal: Negative for abdominal pain, blood in stool, diarrhea and nausea.   Genitourinary: Negative for dysuria, frequency and urgency.   Musculoskeletal: Negative for arthralgias, back pain and neck pain.   Neurological: Negative for weakness and headaches.   Psychiatric/Behavioral: The patient is not nervous/anxious.    All other systems reviewed and are negative.      Current Outpatient Medications   Medication Sig Dispense Refill   • doxycycline hyclate (VIBRA-TABS) 100 mg tablet Take 1 tablet (100 mg total) by mouth 2 (two) times a day for 10 days. 20 tablet 0   • MAGNESIUM CHLORIDE ORAL Take 1,400 mg by mouth every morning.     • multivitamin tablet Take 1 tablet by mouth every morning.     • predniSONE (DELTASONE) 10 mg tablet Take 1 tablet (10 mg total) by mouth 4 (four) times a day for 4 days. 16 tablet 0   • selenium 200 mcg capsule Take 200 mcg by mouth every evening.     • NON FORMULARY MEDICATION REQUEST Omega 3/fish oil 1000mg 1 capsule po BID     • NON FORMULARY MEDICATION REQUEST Astaxanthin 1 tablet po each evening     • NON FORMULARY MEDICATION REQUEST Trianex 0.05% apply to skin daily prn     • NON FORMULARY MEDICATION REQUEST Cholestoff plus 1 tab po BID  900mg       No current facility-administered medications for this visit.     Past Medical  History:   Diagnosis Date   • Coronary artery disease     coronary CTA 22:  Multivessel coronary artery disease that is mild to moderate in severity and likely nonobstructive; CACS 126   • COVID-19     ,    • Exercise-induced tachycardia    • Fatty liver    • Gastroenteritis      Family History   Problem Relation Age of Onset   • Lung disease Biological Mother    • Heart disease Biological Father         CABG in his 50s;  at age 70   • COPD Biological Father    • Heart disease Biological Brother         CABG at age 58   • Hyperlipidemia Biological Brother    • Hypertension Biological Brother    • No Known Problems Biological Sister    • Cancer Maternal Grandmother          in her mid 60s   • Heart attack Maternal Grandfather         multiple heart attacks starting at age 50   • Heart disease Maternal Grandfather         CHF -  in his 70s   • No Known Problems Biological Brother    • Hypertension Biological Brother    • Diabetes Biological Brother    • Autoimmune disease Biological Sister    • Crohn's disease Biological Sister    • Autoimmune disease Biological Brother    • No Known Problems Biological Sister      Past Surgical History:   Procedure Laterality Date   • HAND SURGERY Right     50 years ago   • KNEE ARTHROSCOPY Right     meniscal and tendon repair   • WISDOM TOOTH EXTRACTION       Social History     Socioeconomic History   • Marital status:      Spouse name: Not on file   • Number of children: Not on file   • Years of education: Not on file   • Highest education level: Not on file   Occupational History   • Not on file   Tobacco Use   • Smoking status: Never     Passive exposure: Never   • Smokeless tobacco: Never   Vaping Use   • Vaping Use: Never used   Substance and Sexual Activity   • Alcohol use: Yes     Comment: 2 drinks weekly   • Drug use: Never   • Sexual activity: Yes     Partners: Female     Birth control/protection: None   Other Topics Concern   • Not on  file   Social History Narrative   • Not on file     Social Determinants of Health     Financial Resource Strain: Not on file   Food Insecurity: No Food Insecurity (3/12/2022)    Hunger Vital Sign    • Worried About Running Out of Food in the Last Year: Never true    • Ran Out of Food in the Last Year: Never true   Transportation Needs: Not on file   Physical Activity: Not on file   Stress: Not on file   Social Connections: Not on file   Intimate Partner Violence: Not on file   Housing Stability: Not on file     No Known Allergies    Objective   No new labs.    Physical Exam  Constitutional:       Appearance: Normal appearance. He is well-developed.   HENT:      Head: Normocephalic and atraumatic.      Right Ear: External ear normal.      Left Ear: External ear normal.      Nose:      Right Sinus: Maxillary sinus tenderness and frontal sinus tenderness present.      Left Sinus: Maxillary sinus tenderness and frontal sinus tenderness present.      Mouth/Throat:      Pharynx: Posterior oropharyngeal erythema present.   Eyes:      Pupils: Pupils are equal, round, and reactive to light.   Neck:      Thyroid: No thyromegaly.   Cardiovascular:      Rate and Rhythm: Normal rate and regular rhythm.      Heart sounds: Normal heart sounds.   Pulmonary:      Effort: Pulmonary effort is normal. No respiratory distress.      Breath sounds: Examination of the right-lower field reveals wheezing. Wheezing present. No decreased breath sounds, rhonchi or rales.   Abdominal:      General: Bowel sounds are normal.      Palpations: Abdomen is soft. There is no mass.      Tenderness: There is no abdominal tenderness. There is no guarding or rebound.      Hernia: No hernia is present.   Musculoskeletal:         General: No deformity. Normal range of motion.      Cervical back: Normal range of motion and neck supple.   Lymphadenopathy:      Head:      Right side of head: No submental, submandibular or tonsillar adenopathy.      Left side of  head: No submental, submandibular or tonsillar adenopathy.      Cervical: No cervical adenopathy.   Skin:     General: Skin is warm and dry.   Neurological:      Mental Status: He is alert and oriented to person, place, and time.      Cranial Nerves: No cranial nerve deficit.   Psychiatric:         Behavior: Behavior normal.         Thought Content: Thought content normal.         Judgment: Judgment normal.         Assessment/Plan     Problem List Items Addressed This Visit    None  Visit Diagnoses     Acute recurrent frontal sinusitis    -  Primary        No orders of the defined types were placed in this encounter.    Uri protocol  Will doxy, medrol  Hx of same  Will follow  Uri protocol    Dean Steele DO  12/22/2023

## 2024-01-17 ENCOUNTER — TELEPHONE (OUTPATIENT)
Dept: PRIMARY CARE | Facility: CLINIC | Age: 64
End: 2024-01-17
Payer: COMMERCIAL

## 2024-01-17 DIAGNOSIS — E78.5 HYPERLIPIDEMIA, UNSPECIFIED HYPERLIPIDEMIA TYPE: Primary | ICD-10-CM

## 2024-02-08 ENCOUNTER — TELEPHONE (OUTPATIENT)
Dept: SURGERY | Facility: CLINIC | Age: 64
End: 2024-02-08
Payer: MEDICARE

## 2024-02-08 ENCOUNTER — TELEPHONE (OUTPATIENT)
Dept: SURGERY | Facility: CLINIC | Age: 64
End: 2024-02-08

## 2024-02-08 NOTE — TELEPHONE ENCOUNTER
Al [phone]    Pt wants to reschedule his procedure. He spoke with the dr and was advised to call.

## 2024-02-28 ENCOUNTER — TELEPHONE (OUTPATIENT)
Dept: PRIMARY CARE | Facility: CLINIC | Age: 64
End: 2024-02-28
Payer: MEDICARE

## 2024-02-28 DIAGNOSIS — E78.2 MIXED HYPERLIPIDEMIA: Primary | ICD-10-CM

## 2024-02-29 ENCOUNTER — APPOINTMENT (OUTPATIENT)
Dept: LAB | Age: 64
End: 2024-02-29
Attending: FAMILY MEDICINE
Payer: MEDICARE

## 2024-02-29 DIAGNOSIS — E78.2 MIXED HYPERLIPIDEMIA: ICD-10-CM

## 2024-02-29 LAB
ALBUMIN SERPL-MCNC: 4.7 G/DL (ref 3.5–5.7)
ALP SERPL-CCNC: 59 IU/L (ref 34–125)
ALT SERPL-CCNC: 34 IU/L (ref 7–52)
ANION GAP SERPL CALC-SCNC: 9 MEQ/L (ref 3–15)
AST SERPL-CCNC: 24 IU/L (ref 13–39)
BASOPHILS # BLD: 0.04 K/UL (ref 0.01–0.1)
BASOPHILS NFR BLD: 0.7 %
BILIRUB SERPL-MCNC: 0.7 MG/DL (ref 0.3–1.2)
BUN SERPL-MCNC: 21 MG/DL (ref 7–25)
CALCIUM SERPL-MCNC: 9.6 MG/DL (ref 8.6–10.3)
CHLORIDE SERPL-SCNC: 106 MEQ/L (ref 98–107)
CHOLEST SERPL-MCNC: 240 MG/DL
CO2 SERPL-SCNC: 27 MEQ/L (ref 21–31)
CREAT SERPL-MCNC: 1 MG/DL (ref 0.7–1.3)
DIFFERENTIAL METHOD BLD: NORMAL
EGFRCR SERPLBLD CKD-EPI 2021: >60 ML/MIN/1.73M*2
EOSINOPHIL # BLD: 0.08 K/UL (ref 0.04–0.54)
EOSINOPHIL NFR BLD: 1.5 %
ERYTHROCYTE [DISTWIDTH] IN BLOOD BY AUTOMATED COUNT: 13.9 % (ref 11.6–14.4)
GLUCOSE SERPL-MCNC: 85 MG/DL (ref 70–99)
HCT VFR BLD AUTO: 48.9 % (ref 40.1–51)
HDLC SERPL-MCNC: 54 MG/DL
HDLC SERPL: 4.4 {RATIO}
HGB BLD-MCNC: 16.2 G/DL (ref 13.7–17.5)
IMM GRANULOCYTES # BLD AUTO: 0.01 K/UL (ref 0–0.08)
IMM GRANULOCYTES NFR BLD AUTO: 0.2 %
LDLC SERPL CALC-MCNC: 162 MG/DL
LYMPHOCYTES # BLD: 1.68 K/UL (ref 1.2–3.5)
LYMPHOCYTES NFR BLD: 31.1 %
MCH RBC QN AUTO: 31 PG (ref 28–33.2)
MCHC RBC AUTO-ENTMCNC: 33.1 G/DL (ref 32.2–36.5)
MCV RBC AUTO: 93.5 FL (ref 83–98)
MONOCYTES # BLD: 0.47 K/UL (ref 0.3–1)
MONOCYTES NFR BLD: 8.7 %
NEUTROPHILS # BLD: 3.12 K/UL (ref 1.7–7)
NEUTS SEG NFR BLD: 57.8 %
NONHDLC SERPL-MCNC: 186 MG/DL
NRBC BLD-RTO: 0 %
PDW BLD AUTO: 10.9 FL (ref 9.4–12.4)
PLATELET # BLD AUTO: 178 K/UL (ref 150–350)
POTASSIUM SERPL-SCNC: 4.4 MEQ/L (ref 3.5–5.1)
PROT SERPL-MCNC: 7.1 G/DL (ref 6–8.2)
RBC # BLD AUTO: 5.23 M/UL (ref 4.5–5.8)
SODIUM SERPL-SCNC: 142 MEQ/L (ref 136–145)
TRIGL SERPL-MCNC: 121 MG/DL
WBC # BLD AUTO: 5.4 K/UL (ref 3.8–10.5)

## 2024-02-29 PROCEDURE — 80061 LIPID PANEL: CPT

## 2024-02-29 PROCEDURE — 80053 COMPREHEN METABOLIC PANEL: CPT

## 2024-02-29 PROCEDURE — 36415 COLL VENOUS BLD VENIPUNCTURE: CPT

## 2024-02-29 PROCEDURE — 85025 COMPLETE CBC W/AUTO DIFF WBC: CPT

## 2024-03-05 ENCOUNTER — TELEMEDICINE (OUTPATIENT)
Dept: PRIMARY CARE | Facility: CLINIC | Age: 64
End: 2024-03-05
Payer: MEDICARE

## 2024-03-05 ENCOUNTER — TELEPHONE (OUTPATIENT)
Dept: PRIMARY CARE | Facility: CLINIC | Age: 64
End: 2024-03-05

## 2024-03-05 DIAGNOSIS — R42 VERTIGO: Primary | ICD-10-CM

## 2024-03-05 DIAGNOSIS — I47.10 SVT (SUPRAVENTRICULAR TACHYCARDIA) (CMS/HCC): ICD-10-CM

## 2024-03-05 PROCEDURE — 99214 OFFICE O/P EST MOD 30 MIN: CPT | Mod: 95 | Performed by: PHYSICIAN ASSISTANT

## 2024-03-05 RX ORDER — METHYLPREDNISOLONE 4 MG/1
TABLET ORAL
Qty: 21 TABLET | Refills: 0 | Status: SHIPPED | OUTPATIENT
Start: 2024-03-05 | End: 2024-03-12

## 2024-03-05 RX ORDER — ONDANSETRON 4 MG/1
4 TABLET, FILM COATED ORAL EVERY 8 HOURS PRN
Qty: 15 TABLET | Refills: 0 | Status: SHIPPED | OUTPATIENT
Start: 2024-03-05 | End: 2024-05-01 | Stop reason: ALTCHOICE

## 2024-03-05 NOTE — PROGRESS NOTES
Verification of Patient Location:  The patient affirms they are currently located in the following state: [place]    Request for Consent:    Audio and Video Encounter   Mervin, my name is RONAL Dugan.  Before we proceed, can you please verify your identification by telling me your full name and date of birth?  Can you tell me who is in the room with you?    You and I are about to have a telemedicine check-in or visit because you have requested it.  This is a live video-conference.  I am a real person, speaking to you in real time.  There is no one else with me on the video-conference. I am not recording this conversation and I am asking you not to record it.  This telemedicine visit will be billed to your health insurance or you, if you are self-insured.  You understand you will be responsible for any copayments or coinsurances that apply to your telemedicine visit.  Communication platform used for this encounter:  [employer] Video Visit (Epic Video Client)       Before starting our telemedicine visit, I am required to get your consent for this virtual check-in or visit by telemedicine. Do you consent?      Patient Response to Request for Consent:  Yes      Visit Documentation:  Subjective     Patient ID: Corey Leon is a 64 y.o. male.  1960      Al is seen today to discuss symptoms of vertigo   Started when he stood up to go to the bathroom in the morning   Feels like the room is spinning   + fatigue (took a 3 hr nap which is unusual for him) and nausea   No fever       The following have been reviewed and updated as appropriate in this visit:        Review of Systems   Constitutional: Negative.    Eyes: Negative for visual disturbance.   Respiratory: Negative.    Cardiovascular: Negative.    Neurological: Positive for dizziness. Negative for headaches.   All other systems reviewed and are negative.        Assessment/Plan   Diagnoses and all orders for this visit:    Vertigo (Primary)  -      methylPREDNISolone (MEDROL DOSEPACK) 4 mg tablet; Follow package directions.  -     ondansetron (ZOFRAN) 4 mg tablet; Take 1 tablet (4 mg total) by mouth every 8 (eight) hours as needed for nausea or vomiting for up to 7 days.        Time Spent:  I spent 39 minutes on this date of service performing the following activities: obtaining history, entering orders, documenting and providing counseling and education.

## 2024-03-05 NOTE — TELEPHONE ENCOUNTER
Pt woke up with vertigo and cold sweats. Pt does not have a fever. Pt said when he is laying down it is spinning. Pt also feels nauseous. Asked pt if he had availability for an appt. Pt is concerned about driving and would have to see if wife can take him in. Pt lives in [place] and has medicare/[employer]etna supp ins. Can pt do a telemed appt?

## 2024-03-07 ASSESSMENT — ENCOUNTER SYMPTOMS
RESPIRATORY NEGATIVE: 1
DIZZINESS: 1
CONSTITUTIONAL NEGATIVE: 1
CARDIOVASCULAR NEGATIVE: 1
HEADACHES: 0

## 2024-03-12 ENCOUNTER — OFFICE VISIT (OUTPATIENT)
Dept: SURGERY | Facility: CLINIC | Age: 64
End: 2024-03-12
Payer: MEDICARE

## 2024-03-12 VITALS
HEART RATE: 67 BPM | HEIGHT: 72 IN | BODY MASS INDEX: 28.79 KG/M2 | TEMPERATURE: 98.3 F | DIASTOLIC BLOOD PRESSURE: 82 MMHG | SYSTOLIC BLOOD PRESSURE: 128 MMHG | WEIGHT: 212.6 LBS | OXYGEN SATURATION: 97 %

## 2024-03-12 DIAGNOSIS — K42.9 REDUCIBLE UMBILICAL HERNIA: ICD-10-CM

## 2024-03-12 DIAGNOSIS — K40.90 REDUCIBLE RIGHT INGUINAL HERNIA: Primary | ICD-10-CM

## 2024-03-12 PROCEDURE — 99213 OFFICE O/P EST LOW 20 MIN: CPT | Performed by: SURGERY

## 2024-03-12 RX ORDER — SODIUM CHLORIDE, SODIUM LACTATE, POTASSIUM CHLORIDE, CALCIUM CHLORIDE 600; 310; 30; 20 MG/100ML; MG/100ML; MG/100ML; MG/100ML
INJECTION, SOLUTION INTRAVENOUS CONTINUOUS
Status: CANCELLED | OUTPATIENT
Start: 2024-05-20 | End: 2024-05-21

## 2024-03-12 ASSESSMENT — ENCOUNTER SYMPTOMS
SHORTNESS OF BREATH: 0
PALPITATIONS: 0
NAUSEA: 0
VOMITING: 0
COUGH: 0
WHEEZING: 0
ACTIVITY CHANGE: 0
ABDOMINAL PAIN: 0
CHEST TIGHTNESS: 0
ABDOMINAL DISTENTION: 0
BRUISES/BLEEDS EASILY: 0

## 2024-03-12 NOTE — PROGRESS NOTES
Surgery Office Note       Patient: Chano Leon  MRN: [id number]  1960    Visit Date: 3/12/2024  Encounter Provider: Randolph Stock  Referring Provider: No ref. provider found    Reason for visit:   Chief Complaint   Patient presents with   • Follow-up     Pt is here to discuss surgery for a inguinal hernia       HPI   Chano Leon is a 64 y.o. male who presents to the office to schedule repair of his reducible right inguinal hernia, as well as repair of a supraumbilical hernia which is reducible.  Chano denies nausea, vomiting, cough, wheezing or shortness of breath.  He has no chest pain on exertion or palpitations and no history of bleeding disorder.      Past Medical History:   Diagnosis Date   • Coronary artery disease     coronary CTA 22:  Multivessel coronary artery disease that is mild to moderate in severity and likely nonobstructive; CACS 126   • COVID-19     ,    • Exercise-induced tachycardia    • Fatty liver    • Gastroenteritis      Past Surgical History:   Procedure Laterality Date   • HAND SURGERY Right     50 years ago   • KNEE ARTHROSCOPY Right     meniscal and tendon repair   • WISDOM TOOTH EXTRACTION       Social History     Social History Narrative   • Not on file     Family History   Problem Relation Age of Onset   • Lung disease Biological Mother    • Heart disease Biological Father         CABG in his 50s;  at age 70   • COPD Biological Father    • Heart disease Biological Brother         CABG at age 58   • Hyperlipidemia Biological Brother    • Hypertension Biological Brother    • No Known Problems Biological Sister    • Cancer Maternal Grandmother          in her mid 60s   • Heart attack Maternal Grandfather         multiple heart attacks starting at age 50   • Heart disease Maternal Grandfather         CHF -  in his 70s   • No Known Problems Biological Brother    • Hypertension Biological Brother    • Diabetes Biological Brother     • Autoimmune disease Biological Sister    • Crohn's disease Biological Sister    • Autoimmune disease Biological Brother    • No Known Problems Biological Sister      Patient has no known allergies.  Current Outpatient Medications   Medication Sig Dispense Refill   • MAGNESIUM CHLORIDE ORAL Take 1,400 mg by mouth every morning.     • multivitamin tablet Take 1 tablet by mouth every morning.     • NON FORMULARY MEDICATION REQUEST Omega 3/fish oil 1000mg 1 capsule po BID     • NON FORMULARY MEDICATION REQUEST Astaxanthin 1 tablet po each evening     • NON FORMULARY MEDICATION REQUEST Trianex 0.05% apply to skin daily prn     • NON FORMULARY MEDICATION REQUEST Cholestoff plus 1 tab po BID  900mg     • rosuvastatin (CRESTOR) 5 mg tablet Take 1 tablet (5 mg total) by mouth daily. 90 tablet 1   • selenium 200 mcg capsule Take 200 mcg by mouth every evening.     • methylPREDNISolone (MEDROL DOSEPACK) 4 mg tablet Follow package directions. (Patient not taking: Reported on 3/12/2024) 21 tablet 0   • ondansetron (ZOFRAN) 4 mg tablet Take 1 tablet (4 mg total) by mouth every 8 (eight) hours as needed for nausea or vomiting for up to 7 days. (Patient not taking: Reported on 3/12/2024) 15 tablet 0     No current facility-administered medications for this visit.       Review of Systems   Constitutional: Negative for activity change.   Respiratory: Negative for cough, chest tightness, shortness of breath and wheezing.    Cardiovascular: Negative for chest pain and palpitations.   Gastrointestinal: Negative for abdominal distention, abdominal pain, nausea and vomiting.   Hematological: Does not bruise/bleed easily.     Objective   Vitals:    03/12/24 1122   BP: 128/82   Pulse: 67   Temp: 36.8 °C (98.3 °F)   SpO2: 97%       Physical Exam  Vitals and nursing note reviewed.   Constitutional:       General: He is not in acute distress.     Appearance: Normal appearance. He is not ill-appearing, toxic-appearing or diaphoretic.   Eyes:       General: No scleral icterus.     Conjunctiva/sclera: Conjunctivae normal.   Pulmonary:      Effort: Pulmonary effort is normal. No respiratory distress.   Abdominal:      General: There is no distension.      Palpations: Abdomen is soft.      Tenderness: There is no abdominal tenderness. There is no guarding.      Hernia: A hernia is present.      Comments: Chano has a reducible and nontender right inguinal hernia as well as a reducible and nontender supraumbilical hernia just above the umbilicus.   Skin:     General: Skin is warm and dry.      Coloration: Skin is not jaundiced or pale.   Neurological:      Mental Status: He is alert.           Labs  Lab Results   Component Value Date    WBC 5.40 02/29/2024    HGB 16.2 02/29/2024    HCT 48.9 02/29/2024    MCV 93.5 02/29/2024     02/29/2024       Lab Results   Component Value Date    GLUCOSE 85 02/29/2024    CALCIUM 9.6 02/29/2024     02/29/2024    K 4.4 02/29/2024    CO2 27 02/29/2024     02/29/2024    BUN 21 02/29/2024    CREATININE 1.0 02/29/2024       Lab Results   Component Value Date    ALT 34 02/29/2024    AST 24 02/29/2024    ALKPHOS 59 02/29/2024    BILITOT 0.7 02/29/2024        Imaging  I have independently reviewed the patient's pertinent imaging for this hospital visit. Significant abnormals are Umbilical hernia..   Assessment: Reducible right inguinal hernia  Reducible umbilical hernia    Plan: Both of these hernias will be repaired.  The umbilical hernia may be performed as an open procedure depending on the findings at laparoscopy.  Chano expressed understanding of the indications for the procedure, the procedure which is planned, the alternatives, including doing nothing, and the risks and complications of each.  I did answer all of his questions to his satisfaction.       Randolph Stock MD  3/12/2024

## 2024-03-12 NOTE — LETTER
March 12, 2024     Dean Steele, DO  [address] [facility] [address]  [place] [address]    Patient: Chano Leon  YOB: 1960  Date of Visit: 3/12/2024      Dear Dr. Steele:    Thank you for referring Chano Leon to me for evaluation. Below are my notes for this consultation.    If you have questions, please do not hesitate to call me. I look forward to following your patient along with you.         Sincerely,        Randolph Stock MD        CC: MD Montrell Schneider, Randolph BARRON MD  3/12/2024  1:31 PM  Signed       Surgery Office Note       Patient: Chano Leon  MRN: [id number]  1960    Visit Date: 3/12/2024  Encounter Provider: Randolph Stock  Referring Provider: No ref. provider found    Reason for visit:   Chief Complaint   Patient presents with   • Follow-up     Pt is here to discuss surgery for a inguinal hernia       HPI   Chano Leon is a 64 y.o. male who presents to the office to schedule repair of his reducible right inguinal hernia, as well as repair of a supraumbilical hernia which is reducible.  Chano denies nausea, vomiting, cough, wheezing or shortness of breath.  He has no chest pain on exertion or palpitations and no history of bleeding disorder.      Past Medical History:   Diagnosis Date   • Coronary artery disease     coronary CTA 6/23/22:  Multivessel coronary artery disease that is mild to moderate in severity and likely nonobstructive; CACS 126   • COVID-19     2021, 2022   • Exercise-induced tachycardia    • Fatty liver    • Gastroenteritis      Past Surgical History:   Procedure Laterality Date   • HAND SURGERY Right     50 years ago   • KNEE ARTHROSCOPY Right 2022    meniscal and tendon repair   • WISDOM TOOTH EXTRACTION       Social History     Social History Narrative   • Not on file     Family History   Problem Relation Age of Onset   • Lung disease Biological Mother    • Heart disease Biological Father         CABG in  his 50s;  at age 70   • COPD Biological Father    • Heart disease Biological Brother         CABG at age 58   • Hyperlipidemia Biological Brother    • Hypertension Biological Brother    • No Known Problems Biological Sister    • Cancer Maternal Grandmother          in her mid 60s   • Heart attack Maternal Grandfather         multiple heart attacks starting at age 50   • Heart disease Maternal Grandfather         CHF -  in his 70s   • No Known Problems Biological Brother    • Hypertension Biological Brother    • Diabetes Biological Brother    • Autoimmune disease Biological Sister    • Crohn's disease Biological Sister    • Autoimmune disease Biological Brother    • No Known Problems Biological Sister      Patient has no known allergies.  Current Outpatient Medications   Medication Sig Dispense Refill   • MAGNESIUM CHLORIDE ORAL Take 1,400 mg by mouth every morning.     • multivitamin tablet Take 1 tablet by mouth every morning.     • NON FORMULARY MEDICATION REQUEST Omega 3/fish oil 1000mg 1 capsule po BID     • NON FORMULARY MEDICATION REQUEST Astaxanthin 1 tablet po each evening     • NON FORMULARY MEDICATION REQUEST Trianex 0.05% apply to skin daily prn     • NON FORMULARY MEDICATION REQUEST Cholestoff plus 1 tab po BID  900mg     • rosuvastatin (CRESTOR) 5 mg tablet Take 1 tablet (5 mg total) by mouth daily. 90 tablet 1   • selenium 200 mcg capsule Take 200 mcg by mouth every evening.     • methylPREDNISolone (MEDROL DOSEPACK) 4 mg tablet Follow package directions. (Patient not taking: Reported on 3/12/2024) 21 tablet 0   • ondansetron (ZOFRAN) 4 mg tablet Take 1 tablet (4 mg total) by mouth every 8 (eight) hours as needed for nausea or vomiting for up to 7 days. (Patient not taking: Reported on 3/12/2024) 15 tablet 0     No current facility-administered medications for this visit.       Review of Systems   Constitutional: Negative for activity change.   Respiratory: Negative for cough, chest  tightness, shortness of breath and wheezing.    Cardiovascular: Negative for chest pain and palpitations.   Gastrointestinal: Negative for abdominal distention, abdominal pain, nausea and vomiting.   Hematological: Does not bruise/bleed easily.     Objective   Vitals:    03/12/24 1122   BP: 128/82   Pulse: 67   Temp: 36.8 °C (98.3 °F)   SpO2: 97%       Physical Exam  Vitals and nursing note reviewed.   Constitutional:       General: He is not in acute distress.     Appearance: Normal appearance. He is not ill-appearing, toxic-appearing or diaphoretic.   Eyes:      General: No scleral icterus.     Conjunctiva/sclera: Conjunctivae normal.   Pulmonary:      Effort: Pulmonary effort is normal. No respiratory distress.   Abdominal:      General: There is no distension.      Palpations: Abdomen is soft.      Tenderness: There is no abdominal tenderness. There is no guarding.      Hernia: A hernia is present.      Comments: Chano has a reducible and nontender right inguinal hernia as well as a reducible and nontender supraumbilical hernia just above the umbilicus.   Skin:     General: Skin is warm and dry.      Coloration: Skin is not jaundiced or pale.   Neurological:      Mental Status: He is alert.           Labs  Lab Results   Component Value Date    WBC 5.40 02/29/2024    HGB 16.2 02/29/2024    HCT 48.9 02/29/2024    MCV 93.5 02/29/2024     02/29/2024       Lab Results   Component Value Date    GLUCOSE 85 02/29/2024    CALCIUM 9.6 02/29/2024     02/29/2024    K 4.4 02/29/2024    CO2 27 02/29/2024     02/29/2024    BUN 21 02/29/2024    CREATININE 1.0 02/29/2024       Lab Results   Component Value Date    ALT 34 02/29/2024    AST 24 02/29/2024    ALKPHOS 59 02/29/2024    BILITOT 0.7 02/29/2024        Imaging  I have independently reviewed the patient's pertinent imaging for this hospital visit. Significant abnormals are Umbilical hernia..   Assessment: Reducible right inguinal hernia  Reducible  umbilical hernia    Plan: Both of these hernias will be repaired.  The umbilical hernia may be performed as an open procedure depending on the findings at laparoscopy.  Chano expressed understanding of the indications for the procedure, the procedure which is planned, the alternatives, including doing nothing, and the risks and complications of each.  I did answer all of his questions to his satisfaction.       Randolph Stock MD  3/12/2024

## 2024-03-14 ENCOUNTER — TELEPHONE (OUTPATIENT)
Dept: PRIMARY CARE | Facility: CLINIC | Age: 64
End: 2024-03-14
Payer: MEDICARE

## 2024-03-14 RX ORDER — PREDNISONE 10 MG/1
10 TABLET ORAL 4 TIMES DAILY
Qty: 16 TABLET | Refills: 0 | Status: SHIPPED | OUTPATIENT
Start: 2024-03-14 | End: 2024-05-01 | Stop reason: ALTCHOICE

## 2024-04-23 DIAGNOSIS — K42.9 UMBILICAL HERNIA WITHOUT OBSTRUCTION AND WITHOUT GANGRENE: Primary | ICD-10-CM

## 2024-04-25 ENCOUNTER — APPOINTMENT (OUTPATIENT)
Dept: LAB | Facility: HOSPITAL | Age: 64
End: 2024-04-25
Attending: SURGERY
Payer: MEDICARE

## 2024-04-25 ENCOUNTER — HOSPITAL ENCOUNTER (OUTPATIENT)
Dept: CARDIOLOGY | Facility: HOSPITAL | Age: 64
Discharge: HOME | End: 2024-04-25
Attending: SURGERY
Payer: MEDICARE

## 2024-04-25 ENCOUNTER — TELEPHONE (OUTPATIENT)
Dept: SURGERY | Facility: CLINIC | Age: 64
End: 2024-04-25
Payer: MEDICARE

## 2024-04-25 ENCOUNTER — HOSPITAL ENCOUNTER (OUTPATIENT)
Dept: RADIOLOGY | Facility: HOSPITAL | Age: 64
Discharge: HOME | End: 2024-04-25
Attending: SURGERY
Payer: MEDICARE

## 2024-04-25 ENCOUNTER — APPOINTMENT (OUTPATIENT)
Dept: LAB | Facility: HOSPITAL | Age: 64
End: 2024-04-25
Attending: NURSE PRACTITIONER
Payer: MEDICARE

## 2024-04-25 DIAGNOSIS — K40.90 REDUCIBLE RIGHT INGUINAL HERNIA: Primary | ICD-10-CM

## 2024-04-25 DIAGNOSIS — K40.90 REDUCIBLE RIGHT INGUINAL HERNIA: ICD-10-CM

## 2024-04-25 DIAGNOSIS — K42.9 UMBILICAL HERNIA WITHOUT OBSTRUCTION AND WITHOUT GANGRENE: ICD-10-CM

## 2024-04-25 DIAGNOSIS — K42.9 REDUCIBLE UMBILICAL HERNIA: ICD-10-CM

## 2024-04-25 LAB
BUN SERPL-MCNC: 26 MG/DL (ref 7–25)
CREAT SERPL-MCNC: 1.1 MG/DL (ref 0.7–1.3)
EGFRCR SERPLBLD CKD-EPI 2021: >60 ML/MIN/1.73M*2

## 2024-04-25 PROCEDURE — 74177 CT ABD & PELVIS W/CONTRAST: CPT

## 2024-04-25 PROCEDURE — 63600105 HC IODINE BASED CONTRAST: Mod: JZ | Performed by: SURGERY

## 2024-04-25 PROCEDURE — 84520 ASSAY OF UREA NITROGEN: CPT

## 2024-04-25 PROCEDURE — 25500000 HC DRUGS/INCIDENT RAD: Performed by: SURGERY

## 2024-04-25 PROCEDURE — 82565 ASSAY OF CREATININE: CPT

## 2024-04-25 PROCEDURE — 36415 COLL VENOUS BLD VENIPUNCTURE: CPT

## 2024-04-25 PROCEDURE — 93005 ELECTROCARDIOGRAM TRACING: CPT

## 2024-04-25 RX ORDER — IOPAMIDOL 755 MG/ML
100 INJECTION, SOLUTION INTRAVASCULAR
Status: COMPLETED | OUTPATIENT
Start: 2024-04-25 | End: 2024-04-25

## 2024-04-25 RX ADMIN — IOPAMIDOL 100 ML: 755 INJECTION, SOLUTION INTRAVENOUS at 18:31

## 2024-04-25 RX ADMIN — BARIUM SULFATE 900 ML: 21 SUSPENSION ORAL at 18:31

## 2024-04-25 NOTE — TELEPHONE ENCOUNTER
Per comments from Dr Stock below  CT abd/pelvis on Chano Burdick, [birth year]? He's due for repair of an inguinal and umbilical hernia on 5/20/24. I ordered it. His last CT was June, 2023 and we need to recheck the size of the hernias.     I spoke with the patient about getting his CT scan that Dr Stock ordered. He is going to schedule it when he gets a chance to check his schedule.

## 2024-04-26 LAB
ATRIAL RATE: 70
P AXIS: 56
PR INTERVAL: 156
QRS DURATION: 90
QT INTERVAL: 386
QTC CALCULATION(BAZETT): 416
R AXIS: -64
T WAVE AXIS: 30
VENTRICULAR RATE: 70

## 2024-04-29 ENCOUNTER — TELEPHONE (OUTPATIENT)
Dept: SURGERY | Facility: CLINIC | Age: 64
End: 2024-04-29

## 2024-04-29 NOTE — TELEPHONE ENCOUNTER
Al [phone]    Pt wants to make sure he did everything he needed to do before his surgery. He's seeing his pcp Wednesday.

## 2024-04-30 ENCOUNTER — PRE-ADMISSION TESTING (OUTPATIENT)
Dept: PREADMISSION TESTING | Age: 64
End: 2024-04-30
Payer: MEDICARE

## 2024-04-30 NOTE — NURSING NOTE
Called pt to reschedule phone interview, unable to call at scheduled time earlier. Set up a time for nurse to call him on 5/1/24 at 1:30pm. He will be seeing his PCP 5/1/24 at 9am. He stated he had labs, EKG and CT scan done last week at [facility]. Pt will be going out of the country later this week for 2 weeks.

## 2024-05-01 ENCOUNTER — TELEPHONE (OUTPATIENT)
Dept: PRIMARY CARE | Facility: CLINIC | Age: 64
End: 2024-05-01

## 2024-05-01 ENCOUNTER — CONSULT (OUTPATIENT)
Dept: PRIMARY CARE | Facility: CLINIC | Age: 64
End: 2024-05-01
Payer: MEDICARE

## 2024-05-01 ENCOUNTER — PRE-ADMISSION TESTING (OUTPATIENT)
Dept: PREADMISSION TESTING | Age: 64
End: 2024-05-01
Payer: MEDICARE

## 2024-05-01 VITALS
HEART RATE: 66 BPM | OXYGEN SATURATION: 98 % | SYSTOLIC BLOOD PRESSURE: 134 MMHG | HEIGHT: 72 IN | TEMPERATURE: 97.8 F | WEIGHT: 213 LBS | BODY MASS INDEX: 28.85 KG/M2 | DIASTOLIC BLOOD PRESSURE: 90 MMHG | RESPIRATION RATE: 18 BRPM

## 2024-05-01 VITALS — HEIGHT: 72 IN | BODY MASS INDEX: 28.44 KG/M2 | WEIGHT: 210 LBS

## 2024-05-01 DIAGNOSIS — K42.9 UMBILICAL HERNIA WITHOUT OBSTRUCTION AND WITHOUT GANGRENE: ICD-10-CM

## 2024-05-01 DIAGNOSIS — E78.2 MIXED HYPERLIPIDEMIA: Primary | ICD-10-CM

## 2024-05-01 DIAGNOSIS — Z01.818 PRE-OP EXAM: ICD-10-CM

## 2024-05-01 DIAGNOSIS — K40.90 INGUINAL HERNIA WITHOUT OBSTRUCTION OR GANGRENE, RECURRENCE NOT SPECIFIED, UNSPECIFIED LATERALITY: ICD-10-CM

## 2024-05-01 PROBLEM — I47.10 SVT (SUPRAVENTRICULAR TACHYCARDIA) (CMS/HCC): Status: RESOLVED | Noted: 2023-02-22 | Resolved: 2024-05-01

## 2024-05-01 PROCEDURE — 99214 OFFICE O/P EST MOD 30 MIN: CPT | Performed by: PHYSICIAN ASSISTANT

## 2024-05-01 RX ORDER — UBIDECARENONE 100 MG
100 CAPSULE ORAL EVERY MORNING
COMMUNITY

## 2024-05-01 ASSESSMENT — ENCOUNTER SYMPTOMS
SORE THROAT: 0
EYE REDNESS: 0
PALPITATIONS: 0
NAUSEA: 0
FATIGUE: 0
DIAPHORESIS: 0
VOMITING: 0
DIARRHEA: 0
BACK PAIN: 0
NERVOUS/ANXIOUS: 0
WHEEZING: 0
FEVER: 0
RHINORRHEA: 0
COUGH: 0
FLANK PAIN: 0
FREQUENCY: 0
DYSURIA: 0
TROUBLE SWALLOWING: 0
DIFFICULTY URINATING: 0
CONSTIPATION: 0
BLOOD IN STOOL: 0
EYE PAIN: 0
HEADACHES: 0
CHILLS: 0
DIZZINESS: 0
MYALGIAS: 0
SHORTNESS OF BREATH: 0
ABDOMINAL PAIN: 0
ARTHRALGIAS: 0

## 2024-05-01 ASSESSMENT — PATIENT HEALTH QUESTIONNAIRE - PHQ9: SUM OF ALL RESPONSES TO PHQ9 QUESTIONS 1 & 2: 0

## 2024-05-01 ASSESSMENT — PAIN SCALES - GENERAL: PAINLEVEL: 0-NO PAIN

## 2024-05-01 NOTE — NURSING NOTE
Completed PAT call with patient. He states he had bloodwork done at  on 4/25, however, the CBC and BMP ordered by Dr Stock was not completed. Patient states he will get this bloodwork done tomorrow morning. He is leaving for [place] tomorrow as well.

## 2024-05-01 NOTE — PRE-PROCEDURE INSTRUCTIONS
1.       You will be called between 1pm-5pm one business day before your procedure to tell you where and when to report. If you do not receive a phone call by 6pm, please call the Operating Room at [phone].    2.        Please report to Surgery Registration on the day of your procedure. You can park in the [facility], enter the front doors of the new Pavilion, and take the [place] elevators to the second floor. Follow signs to Surgery Registration.       3. Please follow the following fasting guidelines:     NOTHING TO EAT OR DRINK AFTER MIDNIGHT PER DR STOCK.    4. Please take ONLY the following medications with a sip of water on the morning of your procedure:     No medication on AM of surgery.     Take Rosuvastatin the night before surgery.    Hold Fish oil, Vitamin E and all supplements one week prior to surgery per Dr Stock    5. Other Instructions: You may brush your teeth the morning of the procedure. Rinse and spit, do not swallow.  Bring a list of your medications with dosages.  Use surgical wash as directed.     6. If you develop a cold, cough, fever, rash, or other symptom prior to the day of the procedure, please report it to your physician immediately.    7. If you need to cancel the procedure for any reason, please contact your physician.    8. Make arrangements to have safe transportation home accompanied by a responsible adult. If you have not arranged safe transportation home, your surgery will be cancelled. Safe transportation may include private vehicle, ride-share service, taxi and public transportation when accompanied by a responsible adult who will assist you home. A responsible adult is someone known to you and does not include the taxi, ride-share or public transit drive transporting you.    9.  If it is medically necessary for you to have a longer stay, you will be informed as soon as the decision is made.    10. Only bring essential items to the hospital.  Do not wear or bring anything  of value to the hospital including jewelry of any kind, money, or wallet. Do not wear make-up or contact lenses.  DO NOT BRING MEDICATIONS FROM HOME unless instructed to do so. DO bring your hearing aids, glasses, and a case    11. No lotion, creams, powders, or oils on skin the morning of procedure     12. Dress in comfortable clothes.    13.  If instructed, please bring a copy of your Advanced Directive (Living Will/Durable Power of ) on the day of your procedure.     14. Ensuring your safety at all times is a very important part of our [facility] Culture of Safety. After having surgery and sedation, you are at risk for falling and balance issues. Although you may feel awake, the effects of the medication can last up to 24 hours after anesthesia. If you need to use the bathroom during your recovery period, nursing staff will escort you there and stay with you to ensure your safety.    15. Refrain from drinking alcohol and smoking cigarettes for 24 hours prior to surgery.    16. Shower with antibacterial soap (Dial) the night before and morning of your procedure.  If your procedure indicates the need for CHG antiseptic wash (Bactoshield or Hibiclens), please use this instead and follow instructions as discussed at the time of your Pre-Admission Testing phone interview or visit.    [facility]  Patient Education Preoperative Showers    Good hygiene, such as frequent handwashing and daily skin cleansing, promotes good health. Daily skin cleansing helps remove germs that may cause infections. The following instructions should be followed to help reduce germs on your skin prior to your surgical procedure.     Bactoshield/Hibiclens CHG 4% is an antiseptic soap. The active ingredient is chlorhexidine gluconate. Do not use this product if you are allergic to chlorhexidine gluconate.  The NIGHT before and the MORNING of your procedure, shower or bathe with Bactoshield. This product should replace your regular soap  used for cleansing most of your body surfaces. Bactoshield should not be used on your head or face; keep out of your eyes, ears and mouth.  If you plan to wash your hair, do so with your regular shampoo. Then, rinse the hair and your body thoroughly to remove any shampoo residue.  Wash your face with regular soap and water only.  Wash your genital area with soap and water only.  Thoroughly rinse your body with warm water from the neck down.  Apply the minimum amount of Bactoshield to cover the skin. Use this product as you would any liquid soap. Leave this on for 2 minutes. NOTE- Bactoshield DOES NOT LATHER like normal soap.   Wash the skin gently and rinse thoroughly with warm water. You do not need to scrub the skin to remove germs.  Do not use regular soap after you have applied and rinsed the Bactoshield.  Change into clean clothes after each shower.   Do not apply any lotions, powders, or perfumes to the body areas that have been cleansed with Bactoshield.  No use of hair removal products or shaving at or near the surgical site 48 hours before your procedure. (72 hours for cardiac patients.)  For those having perineal surgeries (i.e.: vaginal, rectal or cystoscopy) - please use Dial soap.    Above instructions reviewed with patient and patient acknowledges understanding.    Form explained by: Tata Vazquez RN

## 2024-05-01 NOTE — TELEPHONE ENCOUNTER
Pt called in because he needs a lipid panel. For the last part of his pre op before his surgery. He says he will be getting the rest of his blood work done by tomorrow morning around 8;30 am. Would like a script to add the lipid panel to be added on.

## 2024-05-01 NOTE — PROGRESS NOTES
Ksenia Abernathy PA-C  Family Medicine in [address]  [place] [address]  Phone: [phone]       Chano Leon is a 64 y.o. male who presents today for   Chief Complaint   Patient presents with    Pre-op Exam     Hernia repair       Presents today for follow-up visit   He has a history of high cholesterol and is taking crestor as prescribed   No concerns today     Needs pre-op exam   having umbilical and inguinal hernias repaired on 24 by Dr. Stock   Labs and EKG were done through PAT last week   Has appointment with nursing at the hospital today         Past Medical History:   Diagnosis Date    Coronary artery disease     coronary CTA 22:  Multivessel coronary artery disease that is mild to moderate in severity and likely nonobstructive; CACS 126    COVID-19     ,     Exercise-induced tachycardia     Fatty liver     Gastroenteritis        Past Surgical History:   Procedure Laterality Date    HAND SURGERY Right     50 years ago    KNEE ARTHROSCOPY Right     meniscal and tendon repair    WISDOM TOOTH EXTRACTION         Social History     Tobacco Use    Smoking status: Never     Passive exposure: Never    Smokeless tobacco: Never   Vaping Use    Vaping Use: Never used   Substance Use Topics    Alcohol use: Yes     Comment: 2 drinks weekly    Drug use: Never       Family History   Problem Relation Age of Onset    Lung disease Biological Mother     Heart disease Biological Father         CABG in his 50s;  at age 70    COPD Biological Father     Heart disease Biological Brother         CABG at age 58    Hyperlipidemia Biological Brother     Hypertension Biological Brother     No Known Problems Biological Sister     Cancer Maternal Grandmother          in her mid 60s    Heart attack Maternal Grandfather         multiple heart attacks starting at age 50    Heart disease Maternal Grandfather         CHF -  in his 70s    No Known Problems  Biological Brother     Hypertension Biological Brother     Diabetes Biological Brother     Autoimmune disease Biological Sister     Crohn's disease Biological Sister     Autoimmune disease Biological Brother     No Known Problems Biological Sister        Patient has no known allergies.      Current Outpatient Medications:     MAGNESIUM CHLORIDE ORAL, Take 1,400 mg by mouth every morning., Disp: , Rfl:     multivitamin tablet, Take 1 tablet by mouth every morning., Disp: , Rfl:     rosuvastatin (CRESTOR) 5 mg tablet, Take 1 tablet (5 mg total) by mouth daily., Disp: 90 tablet, Rfl: 1    selenium 200 mcg capsule, Take 200 mcg by mouth every evening., Disp: , Rfl:     NON FORMULARY MEDICATION REQUEST, Omega 3/fish oil 1000mg 1 capsule po BID, Disp: , Rfl:     NON FORMULARY MEDICATION REQUEST, Astaxanthin 1 tablet po each evening, Disp: , Rfl:     NON FORMULARY MEDICATION REQUEST, Trianex 0.05% apply to skin daily prn, Disp: , Rfl:     NON FORMULARY MEDICATION REQUEST, Cholestoff plus 1 tab po BID 900mg, Disp: , Rfl:     ondansetron (ZOFRAN) 4 mg tablet, Take 1 tablet (4 mg total) by mouth every 8 (eight) hours as needed for nausea or vomiting for up to 7 days. (Patient not taking: Reported on 3/12/2024), Disp: 15 tablet, Rfl: 0    predniSONE (DELTASONE) 10 mg tablet, Take 1 tablet (10 mg total) by mouth 4 (four) times a day for 4 days., Disp: 16 tablet, Rfl: 0    Review of Systems   Constitutional:  Negative for chills, diaphoresis, fatigue and fever.   HENT:  Negative for ear pain, rhinorrhea, sore throat and trouble swallowing.    Eyes:  Negative for pain, redness and visual disturbance.   Respiratory:  Negative for cough, shortness of breath and wheezing.    Cardiovascular:  Negative for chest pain and palpitations.   Gastrointestinal:  Negative for abdominal pain, blood in stool, constipation, diarrhea, nausea and vomiting.   Genitourinary:  Negative for difficulty urinating, dysuria, flank pain, frequency and  urgency.   Musculoskeletal:  Negative for arthralgias, back pain and myalgias.   Skin:  Negative for rash.   Neurological:  Negative for dizziness and headaches.   Psychiatric/Behavioral:  The patient is not nervous/anxious.    All other systems reviewed and are negative.      Objective   Vitals:    05/01/24 0909   BP: (!) 134/90   Pulse: 66   Resp: 18   Temp: 36.6 °C (97.8 °F)   SpO2: 98%   Weight: 96.6 kg (213 lb)   Height: 1.829 m (6')     Body mass index is 28.89 kg/m².    Physical Exam  Vitals reviewed.   Constitutional:       General: He is not in acute distress.     Appearance: Normal appearance. He is not diaphoretic.   HENT:      Head: Normocephalic and atraumatic.      Right Ear: Tympanic membrane, ear canal and external ear normal. There is no impacted cerumen.      Left Ear: Tympanic membrane, ear canal and external ear normal. There is no impacted cerumen.      Nose: Nose normal.      Mouth/Throat:      Pharynx: No oropharyngeal exudate or posterior oropharyngeal erythema.   Eyes:      General: No scleral icterus.     Extraocular Movements: Extraocular movements intact.      Conjunctiva/sclera: Conjunctivae normal.      Pupils: Pupils are equal, round, and reactive to light.   Cardiovascular:      Rate and Rhythm: Normal rate and regular rhythm.      Heart sounds: Normal heart sounds. No murmur heard.  Pulmonary:      Effort: Pulmonary effort is normal. No respiratory distress.      Breath sounds: Normal breath sounds. No wheezing.   Abdominal:      General: Bowel sounds are normal. There is no distension.      Palpations: Abdomen is soft.      Tenderness: There is no abdominal tenderness.   Musculoskeletal:         General: No swelling. Normal range of motion.      Cervical back: Neck supple. No tenderness.   Lymphadenopathy:      Cervical: No cervical adenopathy.   Skin:     General: Skin is warm and dry.      Findings: No rash.   Neurological:      General: No focal deficit present.      Mental  Status: He is alert and oriented to person, place, and time.   Psychiatric:         Mood and Affect: Mood normal.         Behavior: Behavior normal.         ASSESSMENT/PLAN:  Diagnoses and all orders for this visit:    Mixed hyperlipidemia        - Continue present medication     Pre-op exam  Umbilical hernia without obstruction and without gangrene  Inguinal hernia without obstruction or gangrene, recurrence not specified, unspecified laterality        - Medically cleared for proposed surgery       The risks and benefits of taking the above medication(s) were reviewed with the patient.  Dosing options and how to take the medication(s) were discussed.  Possible common side effects were reviewed as well.    The pt voiced understanding to the plan as outlined above.          RONAL Dugan  5/1/2024

## 2024-05-02 ENCOUNTER — APPOINTMENT (OUTPATIENT)
Dept: LAB | Age: 64
End: 2024-05-02
Attending: FAMILY MEDICINE
Payer: MEDICARE

## 2024-05-02 DIAGNOSIS — I25.10 ASCVD (ARTERIOSCLEROTIC CARDIOVASCULAR DISEASE): ICD-10-CM

## 2024-05-02 DIAGNOSIS — K42.9 REDUCIBLE UMBILICAL HERNIA: ICD-10-CM

## 2024-05-02 DIAGNOSIS — K40.90 REDUCIBLE RIGHT INGUINAL HERNIA: ICD-10-CM

## 2024-05-02 DIAGNOSIS — E78.5 HYPERLIPIDEMIA, UNSPECIFIED HYPERLIPIDEMIA TYPE: ICD-10-CM

## 2024-05-02 LAB
ALBUMIN SERPL-MCNC: 4.6 G/DL (ref 3.5–5.7)
ALP SERPL-CCNC: 56 IU/L (ref 34–125)
ALT SERPL-CCNC: 34 IU/L (ref 7–52)
ANION GAP SERPL CALC-SCNC: 7 MEQ/L (ref 3–15)
AST SERPL-CCNC: 23 IU/L (ref 13–39)
BASOPHILS # BLD: 0.02 K/UL (ref 0.01–0.1)
BASOPHILS NFR BLD: 0.4 %
BILIRUB SERPL-MCNC: 0.7 MG/DL (ref 0.3–1.2)
BUN SERPL-MCNC: 20 MG/DL (ref 7–25)
CALCIUM SERPL-MCNC: 9.5 MG/DL (ref 8.6–10.3)
CHLORIDE SERPL-SCNC: 108 MEQ/L (ref 98–107)
CHOLEST SERPL-MCNC: 162 MG/DL
CO2 SERPL-SCNC: 27 MEQ/L (ref 21–31)
CREAT SERPL-MCNC: 1 MG/DL (ref 0.7–1.3)
DIFFERENTIAL METHOD BLD: NORMAL
EGFRCR SERPLBLD CKD-EPI 2021: >60 ML/MIN/1.73M*2
EOSINOPHIL # BLD: 0.09 K/UL (ref 0.04–0.54)
EOSINOPHIL NFR BLD: 1.8 %
ERYTHROCYTE [DISTWIDTH] IN BLOOD BY AUTOMATED COUNT: 13.5 % (ref 11.6–14.4)
GLUCOSE SERPL-MCNC: 92 MG/DL (ref 70–99)
HCT VFR BLD AUTO: 48 % (ref 40.1–51)
HDLC SERPL-MCNC: 50 MG/DL
HDLC SERPL: 3.2 {RATIO}
HGB BLD-MCNC: 16 G/DL (ref 13.7–17.5)
IMM GRANULOCYTES # BLD AUTO: 0.01 K/UL (ref 0–0.08)
IMM GRANULOCYTES NFR BLD AUTO: 0.2 %
LDLC SERPL CALC-MCNC: 97 MG/DL
LYMPHOCYTES # BLD: 1.48 K/UL (ref 1.2–3.5)
LYMPHOCYTES NFR BLD: 29 %
MCH RBC QN AUTO: 32.1 PG (ref 28–33.2)
MCHC RBC AUTO-ENTMCNC: 33.3 G/DL (ref 32.2–36.5)
MCV RBC AUTO: 96.4 FL (ref 83–98)
MONOCYTES # BLD: 0.49 K/UL (ref 0.3–1)
MONOCYTES NFR BLD: 9.6 %
NEUTROPHILS # BLD: 3.01 K/UL (ref 1.7–7)
NEUTS SEG NFR BLD: 59 %
NONHDLC SERPL-MCNC: 112 MG/DL
NRBC BLD-RTO: 0 %
PDW BLD AUTO: 10.8 FL (ref 9.4–12.4)
PLATELET # BLD AUTO: 168 K/UL (ref 150–350)
POTASSIUM SERPL-SCNC: 4.1 MEQ/L (ref 3.5–5.1)
PROT SERPL-MCNC: 6.9 G/DL (ref 6–8.2)
RBC # BLD AUTO: 4.98 M/UL (ref 4.5–5.8)
SODIUM SERPL-SCNC: 142 MEQ/L (ref 136–145)
TRIGL SERPL-MCNC: 73 MG/DL
WBC # BLD AUTO: 5.1 K/UL (ref 3.8–10.5)

## 2024-05-02 PROCEDURE — 36415 COLL VENOUS BLD VENIPUNCTURE: CPT

## 2024-05-02 PROCEDURE — 80061 LIPID PANEL: CPT

## 2024-05-02 PROCEDURE — 85025 COMPLETE CBC W/AUTO DIFF WBC: CPT

## 2024-05-02 PROCEDURE — 80053 COMPREHEN METABOLIC PANEL: CPT

## 2024-05-16 ENCOUNTER — OFFICE VISIT (OUTPATIENT)
Dept: CARDIOLOGY | Facility: CLINIC | Age: 64
End: 2024-05-16
Payer: MEDICARE

## 2024-05-16 VITALS
HEART RATE: 93 BPM | HEIGHT: 72 IN | SYSTOLIC BLOOD PRESSURE: 128 MMHG | BODY MASS INDEX: 29.2 KG/M2 | WEIGHT: 215.6 LBS | DIASTOLIC BLOOD PRESSURE: 86 MMHG | OXYGEN SATURATION: 97 %

## 2024-05-16 DIAGNOSIS — R93.1 AGATSTON CAC SCORE 100-199: ICD-10-CM

## 2024-05-16 DIAGNOSIS — Z01.810 PREOP CARDIOVASCULAR EXAM: ICD-10-CM

## 2024-05-16 DIAGNOSIS — E78.2 MIXED HYPERLIPIDEMIA: Primary | ICD-10-CM

## 2024-05-16 LAB
ATRIAL RATE: 75
P AXIS: 66
PR INTERVAL: 162
QRS DURATION: 86
QT INTERVAL: 374
QTC CALCULATION(BAZETT): 417
R AXIS: -64
T WAVE AXIS: 50
VENTRICULAR RATE: 75

## 2024-05-16 PROCEDURE — 99214 OFFICE O/P EST MOD 30 MIN: CPT

## 2024-05-16 PROCEDURE — 93000 ELECTROCARDIOGRAM COMPLETE: CPT

## 2024-05-16 ASSESSMENT — ENCOUNTER SYMPTOMS
DIZZINESS: 0
SYNCOPE: 0
PALPITATIONS: 0
SHORTNESS OF BREATH: 0
DYSPNEA ON EXERTION: 0
LIGHT-HEADEDNESS: 0
NEAR-SYNCOPE: 0
ORTHOPNEA: 0
PND: 0

## 2024-05-16 NOTE — ASSESSMENT & PLAN NOTE
- CTA coronary 5/2022: 126 (LAD 55 LCX 43 RCA 28)  Multivessel coronary artery disease as above that is mild to moderate in severity and likely nonobstructive.  - High workload and denies anginal symptoms. EKG with no acute change. Continue medical management.

## 2024-05-16 NOTE — PROGRESS NOTES
Pre-Operative   Consult Note         Reason for visit:   Chief Complaint   Patient presents with    Pre-op Exam       HPI     Chano Leon comes to the office today for preoperative cardiac evaluation prior to a lap inguinal hernia repair with Dr. Stock on 24.    64 y.o. male who presents to the office for cardiovascular follow up and management of  and dyslipidemia.  He also has a strong family history of premature CAD.     The patient was last seen 10/12/22. He was stable from a cardiac standpoint.    Today:  He has been doing well. He still remains active. He goes swimming 4 days a week for 1-1.5 hours, and he just came back from [employer]- walking 15-20K steps a day without any CV Limitations. He is not checking his blood pressure at home but follows closely with PCP and his BP has remained controlled without medication. He otherwise offers no cardiac complaints.       Past Medical History:   Diagnosis Date    Coronary artery disease     coronary CTA 22:  Multivessel coronary artery disease that is mild to moderate in severity and likely nonobstructive; CACS 126    COVID-19     ,     Exercise-induced tachycardia     pt states resolved    Fatty liver     Gastroenteritis        Past Surgical History:   Procedure Laterality Date    COLONOSCOPY      HAND SURGERY Right     50 years ago    KNEE ARTHROSCOPY Right     meniscal and tendon repair    WISDOM TOOTH EXTRACTION         Social History     Tobacco Use   Smoking Status Never    Passive exposure: Never   Smokeless Tobacco Never     Social History     Tobacco Use    Smoking status: Never     Passive exposure: Never    Smokeless tobacco: Never   Vaping Use    Vaping Use: Never used   Substance Use Topics    Alcohol use: Yes     Comment: 2 drinks weekly    Drug use: Never       Family History   Problem Relation Age of Onset    Lung disease Biological Mother     Heart disease Biological Father         CABG in his 50s;  at age  70    COPD Biological Father     Heart disease Biological Brother         CABG at age 58    Hyperlipidemia Biological Brother     Hypertension Biological Brother     No Known Problems Biological Sister     Cancer Maternal Grandmother          in her mid 60s    Heart attack Maternal Grandfather         multiple heart attacks starting at age 50    Heart disease Maternal Grandfather         CHF -  in his 70s    No Known Problems Biological Brother     Hypertension Biological Brother     Diabetes Biological Brother     Autoimmune disease Biological Sister     Crohn's disease Biological Sister     Autoimmune disease Biological Brother     No Known Problems Biological Sister        Allergies:  Patient has no known allergies.    Current Outpatient Medications   Medication Sig Dispense Refill    coenzyme Q10 (COQ10) 100 mg capsule Take 100 mg by mouth every morning.      MAGNESIUM CHLORIDE ORAL Take 1,400 mg by mouth every morning.      multivitamin tablet Take 1 tablet by mouth every morning.      NON FORMULARY MEDICATION REQUEST Omega 3/fish oil 1000mg 1 capsule po BID      NON FORMULARY MEDICATION REQUEST Take 12 mg by mouth every evening. Astaxanthin 1 tablet po each evening      NON FORMULARY MEDICATION REQUEST Trianex 0.05% apply to skin daily prn      NON FORMULARY MEDICATION REQUEST Cholestoff plus 1 tab po BID  900mg      rosuvastatin (CRESTOR) 5 mg tablet Take 1 tablet (5 mg total) by mouth daily. (Patient taking differently: Take 5 mg by mouth every evening.) 90 tablet 1    selenium 200 mcg capsule Take 200 mcg by mouth every evening.       No current facility-administered medications for this visit.       Review of Systems   Cardiovascular:  Negative for chest pain, dyspnea on exertion, leg swelling, near-syncope, orthopnea, palpitations, paroxysmal nocturnal dyspnea and syncope.   Respiratory:  Negative for shortness of breath.    Neurological:  Negative for dizziness and light-headedness.        Objective     Vitals:    05/16/24 1129   BP: 128/86   Pulse: 93   SpO2: 97%       Wt Readings from Last 1 Encounters:   05/16/24 97.8 kg (215 lb 9.6 oz)       Physical Exam  Constitutional:       General: He is not in acute distress.     Appearance: Normal appearance.   HENT:      Head: Normocephalic.   Eyes:      Extraocular Movements: Extraocular movements intact.   Neck:      Vascular: No JVD.   Cardiovascular:      Rate and Rhythm: Normal rate and regular rhythm.      Heart sounds: Normal heart sounds. No murmur heard.  Pulmonary:      Breath sounds: Normal breath sounds. No wheezing, rhonchi or rales.   Abdominal:      Palpations: Abdomen is soft.   Musculoskeletal:         General: No swelling.   Skin:     General: Skin is warm and dry.   Neurological:      Mental Status: He is alert.   Psychiatric:         Mood and Affect: Mood normal.          ECG   Normal sinus rhythm with sinus arrhythmia   Left axis deviation   Abnormal ECG   When compared with ECG of 25-APR-2024 17:04,   No significant change was found     Labs   Lab Results   Component Value Date    WBC 5.10 05/02/2024    HGB 16.0 05/02/2024    HCT 48.0 05/02/2024     05/02/2024    ALT 34 05/02/2024    AST 23 05/02/2024     05/02/2024    K 4.1 05/02/2024     (H) 05/02/2024    CREATININE 1.0 05/02/2024    BUN 20 05/02/2024    CO2 27 05/02/2024    PT 13.6 03/12/2022    GLUCOSE 92 05/02/2024         Estimated Functional Capacity  The patient has a Duke Activity Status Index score of 52.95, corresponding to an expected exertional capacity of 9.25 METS.          Assessment/Plan     Preop cardiovascular exam  He is scheduled for a lap inguinal hernia repair with Dr. Stock on 5/20/24  He has no active cardiopulmonary symptoms.  He has good functional capacity, able to walk 2 blocks or climb a flight of stairs without cardiopulmonary limitation.  Using the Cuevas risk stratification tool, he has a less than 1% risk of perioperative major  adverse cardiac events.  Additional cardiac testing or interventions would be unlikely to decrease this risk and therefore would not .    He may proceed with the proposed procedure as planned.     Agatston CAC score 100-199  - CTA coronary 5/2022: 126 (LAD 55 LCX 43 RCA 28)  Multivessel coronary artery disease as above that is mild to moderate in severity and likely nonobstructive.  - High workload and denies anginal symptoms. EKG with no acute change. Continue medical management.     Mixed hyperlipidemia  - LDL Goal <70  - 5/2024 FLP: TG 73 HDL 50 LDL 97 (162)-- on Crestor 5 mg daily  - We discussed more aggressive lipid management with known CAD and strong family hx of premature CAD. He is hesitant to increase his Crestor due to possible side effects. We discussed alternatives like Zetia/PCK 9 and drawing other cardiac markers including lipoprotein/hs-CRP. He will consider and notify us.                NOLBERTO Aparicio  5/16/2024

## 2024-05-16 NOTE — ASSESSMENT & PLAN NOTE
- LDL Goal <70  - 5/2024 FLP: TG 73 HDL 50 LDL 97 (162)-- on Crestor 5 mg daily  - We discussed more aggressive lipid management with known CAD and strong family hx of premature CAD. He is hesitant to increase his Crestor due to possible side effects. We discussed alternatives like Zetia/PCK 9 and drawing other cardiac markers including lipoprotein/hs-CRP. He will consider and notify us.

## 2024-05-16 NOTE — H&P (VIEW-ONLY)
Pre-Operative   Consult Note         Reason for visit:   Chief Complaint   Patient presents with    Pre-op Exam       HPI     Chano Leon comes to the office today for preoperative cardiac evaluation prior to a lap inguinal hernia repair with Dr. Stock on 24.    64 y.o. male who presents to the office for cardiovascular follow up and management of  and dyslipidemia.  He also has a strong family history of premature CAD.     The patient was last seen 10/12/22. He was stable from a cardiac standpoint.    Today:  He has been doing well. He still remains active. He goes swimming 4 days a week for 1-1.5 hours, and he just came back from [place]- walking 15-20K steps a day without any CV Limitations. He is not checking his blood pressure at home but follows closely with PCP and his BP has remained controlled without medication. He otherwise offers no cardiac complaints.       Past Medical History:   Diagnosis Date    Coronary artery disease     coronary CTA 22:  Multivessel coronary artery disease that is mild to moderate in severity and likely nonobstructive; CACS 126    COVID-19     ,     Exercise-induced tachycardia     pt states resolved    Fatty liver     Gastroenteritis        Past Surgical History:   Procedure Laterality Date    COLONOSCOPY      HAND SURGERY Right     50 years ago    KNEE ARTHROSCOPY Right     meniscal and tendon repair    WISDOM TOOTH EXTRACTION         Social History     Tobacco Use   Smoking Status Never    Passive exposure: Never   Smokeless Tobacco Never     Social History     Tobacco Use    Smoking status: Never     Passive exposure: Never    Smokeless tobacco: Never   Vaping Use    Vaping Use: Never used   Substance Use Topics    Alcohol use: Yes     Comment: 2 drinks weekly    Drug use: Never       Family History   Problem Relation Age of Onset    Lung disease Biological Mother     Heart disease Biological Father         CABG in his 50s;  at age  70    COPD Biological Father     Heart disease Biological Brother         CABG at age 58    Hyperlipidemia Biological Brother     Hypertension Biological Brother     No Known Problems Biological Sister     Cancer Maternal Grandmother          in her mid 60s    Heart attack Maternal Grandfather         multiple heart attacks starting at age 50    Heart disease Maternal Grandfather         CHF -  in his 70s    No Known Problems Biological Brother     Hypertension Biological Brother     Diabetes Biological Brother     Autoimmune disease Biological Sister     Crohn's disease Biological Sister     Autoimmune disease Biological Brother     No Known Problems Biological Sister        Allergies:  Patient has no known allergies.    Current Outpatient Medications   Medication Sig Dispense Refill    coenzyme Q10 (COQ10) 100 mg capsule Take 100 mg by mouth every morning.      MAGNESIUM CHLORIDE ORAL Take 1,400 mg by mouth every morning.      multivitamin tablet Take 1 tablet by mouth every morning.      NON FORMULARY MEDICATION REQUEST Omega 3/fish oil 1000mg 1 capsule po BID      NON FORMULARY MEDICATION REQUEST Take 12 mg by mouth every evening. Astaxanthin 1 tablet po each evening      NON FORMULARY MEDICATION REQUEST Trianex 0.05% apply to skin daily prn      NON FORMULARY MEDICATION REQUEST Cholestoff plus 1 tab po BID  900mg      rosuvastatin (CRESTOR) 5 mg tablet Take 1 tablet (5 mg total) by mouth daily. (Patient taking differently: Take 5 mg by mouth every evening.) 90 tablet 1    selenium 200 mcg capsule Take 200 mcg by mouth every evening.       No current facility-administered medications for this visit.       Review of Systems   Cardiovascular:  Negative for chest pain, dyspnea on exertion, leg swelling, near-syncope, orthopnea, palpitations, paroxysmal nocturnal dyspnea and syncope.   Respiratory:  Negative for shortness of breath.    Neurological:  Negative for dizziness and light-headedness.        Objective     Vitals:    05/16/24 1129   BP: 128/86   Pulse: 93   SpO2: 97%       Wt Readings from Last 1 Encounters:   05/16/24 97.8 kg (215 lb 9.6 oz)       Physical Exam  Constitutional:       General: He is not in acute distress.     Appearance: Normal appearance.   HENT:      Head: Normocephalic.   Eyes:      Extraocular Movements: Extraocular movements intact.   Neck:      Vascular: No JVD.   Cardiovascular:      Rate and Rhythm: Normal rate and regular rhythm.      Heart sounds: Normal heart sounds. No murmur heard.  Pulmonary:      Breath sounds: Normal breath sounds. No wheezing, rhonchi or rales.   Abdominal:      Palpations: Abdomen is soft.   Musculoskeletal:         General: No swelling.   Skin:     General: Skin is warm and dry.   Neurological:      Mental Status: He is alert.   Psychiatric:         Mood and Affect: Mood normal.          ECG   Normal sinus rhythm with sinus arrhythmia   Left axis deviation   Abnormal ECG   When compared with ECG of 25-APR-2024 17:04,   No significant change was found     Labs   Lab Results   Component Value Date    WBC 5.10 05/02/2024    HGB 16.0 05/02/2024    HCT 48.0 05/02/2024     05/02/2024    ALT 34 05/02/2024    AST 23 05/02/2024     05/02/2024    K 4.1 05/02/2024     (H) 05/02/2024    CREATININE 1.0 05/02/2024    BUN 20 05/02/2024    CO2 27 05/02/2024    PT 13.6 03/12/2022    GLUCOSE 92 05/02/2024         Estimated Functional Capacity  The patient has a Duke Activity Status Index score of 52.95, corresponding to an expected exertional capacity of 9.25 METS.          Assessment/Plan     Preop cardiovascular exam  He is scheduled for a lap inguinal hernia repair with Dr. Stock on 5/20/24  He has no active cardiopulmonary symptoms.  He has good functional capacity, able to walk 2 blocks or climb a flight of stairs without cardiopulmonary limitation.  Using the Cuevas risk stratification tool, he has a less than 1% risk of perioperative major  adverse cardiac events.  Additional cardiac testing or interventions would be unlikely to decrease this risk and therefore would not .    He may proceed with the proposed procedure as planned.     Agatston CAC score 100-199  - CTA coronary 5/2022: 126 (LAD 55 LCX 43 RCA 28)  Multivessel coronary artery disease as above that is mild to moderate in severity and likely nonobstructive.  - High workload and denies anginal symptoms. EKG with no acute change. Continue medical management.     Mixed hyperlipidemia  - LDL Goal <70  - 5/2024 FLP: TG 73 HDL 50 LDL 97 (162)-- on Crestor 5 mg daily  - We discussed more aggressive lipid management with known CAD and strong family hx of premature CAD. He is hesitant to increase his Crestor due to possible side effects. We discussed alternatives like Zetia/PCK 9 and drawing other cardiac markers including lipoprotein/hs-CRP. He will consider and notify us.                NOLBERTO Aparicio  5/16/2024

## 2024-05-16 NOTE — LETTER
May 16, 2024     Dean Steele, DO  [address] [facility]  [id number]  [place] [id number]    Patient: Chano Leon  YOB: 1960  Date of Visit: 5/16/2024      Dear Dr. Steele:    Thank you for referring Chano Leon to me for evaluation. Below are my notes for this consultation.    If you have questions, please do not hesitate to call me. I look forward to following your patient along with you.         Sincerely,        NOLBERTO Aparicio        CC: MD Kolton Nathan MD Larosa, Cindy, CRNP  5/16/2024 12:07 PM  Sign when Signing Visit       Pre-Operative   Consult Note         Reason for visit:   Chief Complaint   Patient presents with   • Pre-op Exam       HPI    Chano Leon comes to the office today for preoperative cardiac evaluation prior to a lap inguinal hernia repair with Dr. Stock on 5/20/24.    64 y.o. male who presents to the office for cardiovascular follow up and management of  and dyslipidemia.  He also has a strong family history of premature CAD.     The patient was last seen 10/12/22. He was stable from a cardiac standpoint.    Today:  He has been doing well. He still remains active. He goes swimming 4 days a week for 1-1.5 hours, and he just came back from [place]- walking 15-20K steps a day without any CV Limitations. He is not checking his blood pressure at home but follows closely with PCP and his BP has remained controlled without medication. He otherwise offers no cardiac complaints.       Past Medical History:   Diagnosis Date   • Coronary artery disease     coronary CTA 6/23/22:  Multivessel coronary artery disease that is mild to moderate in severity and likely nonobstructive; CACS 126   • COVID-19     2021, 2022   • Exercise-induced tachycardia     pt states resolved   • Fatty liver    • Gastroenteritis        Past Surgical History:   Procedure Laterality Date   • COLONOSCOPY     • HAND SURGERY Right     50 years ago   • KNEE  ARTHROSCOPY Right     meniscal and tendon repair   • WISDOM TOOTH EXTRACTION         Social History     Tobacco Use   Smoking Status Never   • Passive exposure: Never   Smokeless Tobacco Never     Social History     Tobacco Use   • Smoking status: Never     Passive exposure: Never   • Smokeless tobacco: Never   Vaping Use   • Vaping Use: Never used   Substance Use Topics   • Alcohol use: Yes     Comment: 2 drinks weekly   • Drug use: Never       Family History   Problem Relation Age of Onset   • Lung disease Biological Mother    • Heart disease Biological Father         CABG in his 50s;  at age 70   • COPD Biological Father    • Heart disease Biological Brother         CABG at age 58   • Hyperlipidemia Biological Brother    • Hypertension Biological Brother    • No Known Problems Biological Sister    • Cancer Maternal Grandmother          in her mid 60s   • Heart attack Maternal Grandfather         multiple heart attacks starting at age 50   • Heart disease Maternal Grandfather         CHF -  in his 70s   • No Known Problems Biological Brother    • Hypertension Biological Brother    • Diabetes Biological Brother    • Autoimmune disease Biological Sister    • Crohn's disease Biological Sister    • Autoimmune disease Biological Brother    • No Known Problems Biological Sister        Allergies:  Patient has no known allergies.    Current Outpatient Medications   Medication Sig Dispense Refill   • coenzyme Q10 (COQ10) 100 mg capsule Take 100 mg by mouth every morning.     • MAGNESIUM CHLORIDE ORAL Take 1,400 mg by mouth every morning.     • multivitamin tablet Take 1 tablet by mouth every morning.     • NON FORMULARY MEDICATION REQUEST Omega 3/fish oil 1000mg 1 capsule po BID     • NON FORMULARY MEDICATION REQUEST Take 12 mg by mouth every evening. Astaxanthin 1 tablet po each evening     • NON FORMULARY MEDICATION REQUEST Trianex 0.05% apply to skin daily prn     • NON FORMULARY MEDICATION REQUEST  Cholestoff plus 1 tab po BID  900mg     • rosuvastatin (CRESTOR) 5 mg tablet Take 1 tablet (5 mg total) by mouth daily. (Patient taking differently: Take 5 mg by mouth every evening.) 90 tablet 1   • selenium 200 mcg capsule Take 200 mcg by mouth every evening.       No current facility-administered medications for this visit.       Review of Systems   Cardiovascular:  Negative for chest pain, dyspnea on exertion, leg swelling, near-syncope, orthopnea, palpitations, paroxysmal nocturnal dyspnea and syncope.   Respiratory:  Negative for shortness of breath.    Neurological:  Negative for dizziness and light-headedness.       Objective    Vitals:    05/16/24 1129   BP: 128/86   Pulse: 93   SpO2: 97%       Wt Readings from Last 1 Encounters:   05/16/24 97.8 kg (215 lb 9.6 oz)       Physical Exam  Constitutional:       General: He is not in acute distress.     Appearance: Normal appearance.   HENT:      Head: Normocephalic.   Eyes:      Extraocular Movements: Extraocular movements intact.   Neck:      Vascular: No JVD.   Cardiovascular:      Rate and Rhythm: Normal rate and regular rhythm.      Heart sounds: Normal heart sounds. No murmur heard.  Pulmonary:      Breath sounds: Normal breath sounds. No wheezing, rhonchi or rales.   Abdominal:      Palpations: Abdomen is soft.   Musculoskeletal:         General: No swelling.   Skin:     General: Skin is warm and dry.   Neurological:      Mental Status: He is alert.   Psychiatric:         Mood and Affect: Mood normal.          ECG   Normal sinus rhythm with sinus arrhythmia   Left axis deviation   Abnormal ECG   When compared with ECG of 25-APR-2024 17:04,   No significant change was found     Labs   Lab Results   Component Value Date    WBC 5.10 05/02/2024    HGB 16.0 05/02/2024    HCT 48.0 05/02/2024     05/02/2024    ALT 34 05/02/2024    AST 23 05/02/2024     05/02/2024    K 4.1 05/02/2024     (H) 05/02/2024    CREATININE 1.0 05/02/2024    BUN 20  05/02/2024    CO2 27 05/02/2024    PT 13.6 03/12/2022    GLUCOSE 92 05/02/2024         Estimated Functional Capacity  The patient has a Duke Activity Status Index score of 52.95, corresponding to an expected exertional capacity of 9.25 METS.          Assessment/Plan    Preop cardiovascular exam  He is scheduled for a lap inguinal hernia repair with Dr. Stock on 5/20/24  He has no active cardiopulmonary symptoms.  He has good functional capacity, able to walk 2 blocks or climb a flight of stairs without cardiopulmonary limitation.  Using the Cuevas risk stratification tool, he has a less than 1% risk of perioperative major adverse cardiac events.  Additional cardiac testing or interventions would be unlikely to decrease this risk and therefore would not .    He may proceed with the proposed procedure as planned.     Agatston CAC score 100-199  - CTA coronary 5/2022: 126 (LAD 55 LCX 43 RCA 28)  Multivessel coronary artery disease as above that is mild to moderate in severity and likely nonobstructive.  - High workload and denies anginal symptoms. EKG with no acute change. Continue medical management.     Mixed hyperlipidemia  - LDL Goal <70  - 5/2024 FLP: TG 73 HDL 50 LDL 97 (162)-- on Crestor 5 mg daily  - We discussed more aggressive lipid management with known CAD and strong family hx of premature CAD. He is hesitant to increase his Crestor due to possible side effects. We discussed alternatives like Zetia/PCK 9 and drawing other cardiac markers including lipoprotein/hs-CRP. He will consider and notify us.                NOLBERTO Aparicio  5/16/2024

## 2024-05-16 NOTE — ASSESSMENT & PLAN NOTE
He is scheduled for a lap inguinal hernia repair with Dr. Stock on 5/20/24  He has no active cardiopulmonary symptoms.  He has good functional capacity, able to walk 2 blocks or climb a flight of stairs without cardiopulmonary limitation.  Using the Cuevas risk stratification tool, he has a less than 1% risk of perioperative major adverse cardiac events.  Additional cardiac testing or interventions would be unlikely to decrease this risk and therefore would not .    He may proceed with the proposed procedure as planned.

## 2024-05-20 ENCOUNTER — ANESTHESIA EVENT (OUTPATIENT)
Dept: OPERATING ROOM | Facility: HOSPITAL | Age: 64
Setting detail: HOSPITAL OUTPATIENT SURGERY
End: 2024-05-20
Payer: MEDICARE

## 2024-05-20 ENCOUNTER — HOSPITAL ENCOUNTER (OUTPATIENT)
Facility: HOSPITAL | Age: 64
Setting detail: HOSPITAL OUTPATIENT SURGERY
Discharge: HOME | End: 2024-05-20
Attending: SURGERY | Admitting: SURGERY
Payer: MEDICARE

## 2024-05-20 VITALS
SYSTOLIC BLOOD PRESSURE: 131 MMHG | WEIGHT: 215 LBS | HEART RATE: 64 BPM | OXYGEN SATURATION: 94 % | DIASTOLIC BLOOD PRESSURE: 70 MMHG | BODY MASS INDEX: 29.12 KG/M2 | HEIGHT: 72 IN | RESPIRATION RATE: 14 BRPM | TEMPERATURE: 97.9 F

## 2024-05-20 PROCEDURE — 27200000 HC STERILE SUPPLY: Performed by: SURGERY

## 2024-05-20 PROCEDURE — 0WUF4JZ SUPPLEMENT ABDOMINAL WALL WITH SYNTHETIC SUBSTITUTE, PERCUTANEOUS ENDOSCOPIC APPROACH: ICD-10-PCS | Performed by: SURGERY

## 2024-05-20 PROCEDURE — 63600000 HC DRUGS/DETAIL CODE: Mod: JZ | Performed by: NURSE ANESTHETIST, CERTIFIED REGISTERED

## 2024-05-20 PROCEDURE — 71000001 HC PACU PHASE 1 INITIAL 30MIN: Performed by: SURGERY

## 2024-05-20 PROCEDURE — 25000000 HC PHARMACY GENERAL: Performed by: NURSE ANESTHETIST, CERTIFIED REGISTERED

## 2024-05-20 PROCEDURE — 63600000 HC DRUGS/DETAIL CODE: Mod: JZ | Performed by: SURGERY

## 2024-05-20 PROCEDURE — 71000011 HC PACU PHASE 1 EA ADDL MIN: Performed by: SURGERY

## 2024-05-20 PROCEDURE — 71000012 HC PACU PHASE 2 EA ADDL MIN: Performed by: SURGERY

## 2024-05-20 PROCEDURE — 36000016 HC OR LEVEL 6 EA ADDL MIN: Performed by: SURGERY

## 2024-05-20 PROCEDURE — 36000006 HC OR LEVEL 6 INITIAL 30MIN: Performed by: SURGERY

## 2024-05-20 PROCEDURE — 8E0W4CZ ROBOTIC ASSISTED PROCEDURE OF TRUNK REGION, PERCUTANEOUS ENDOSCOPIC APPROACH: ICD-10-PCS | Performed by: SURGERY

## 2024-05-20 PROCEDURE — 63600000 HC DRUGS/DETAIL CODE: Mod: JW | Performed by: NURSE ANESTHETIST, CERTIFIED REGISTERED

## 2024-05-20 PROCEDURE — 49594 RPR AA HRN 1ST 3-10 NCR/STRN: CPT | Performed by: SURGERY

## 2024-05-20 PROCEDURE — 63700000 HC SELF-ADMINISTRABLE DRUG

## 2024-05-20 PROCEDURE — 37000001 HC ANESTHESIA GENERAL: Performed by: SURGERY

## 2024-05-20 PROCEDURE — C1781 MESH (IMPLANTABLE): HCPCS | Performed by: SURGERY

## 2024-05-20 PROCEDURE — 71000002 HC PACU PHASE 2 INITIAL 30MIN: Performed by: SURGERY

## 2024-05-20 PROCEDURE — 63600000 HC DRUGS/DETAIL CODE: Mod: JZ

## 2024-05-20 DEVICE — MESH PARIETENE 12CM DS COMPOSITE: Type: IMPLANTABLE DEVICE | Site: ABDOMEN | Status: FUNCTIONAL

## 2024-05-20 RX ORDER — ONDANSETRON HYDROCHLORIDE 2 MG/ML
4 INJECTION, SOLUTION INTRAVENOUS
Status: DISCONTINUED | OUTPATIENT
Start: 2024-05-20 | End: 2024-05-20 | Stop reason: HOSPADM

## 2024-05-20 RX ORDER — FENTANYL CITRATE 50 UG/ML
50 INJECTION, SOLUTION INTRAMUSCULAR; INTRAVENOUS EVERY 5 MIN PRN
Status: DISCONTINUED | OUTPATIENT
Start: 2024-05-20 | End: 2024-05-20 | Stop reason: HOSPADM

## 2024-05-20 RX ORDER — OXYCODONE HYDROCHLORIDE 5 MG/1
5 TABLET ORAL ONCE AS NEEDED
Status: DISCONTINUED | OUTPATIENT
Start: 2024-05-20 | End: 2024-05-20 | Stop reason: HOSPADM

## 2024-05-20 RX ORDER — ACETAMINOPHEN 325 MG/1
TABLET ORAL
Status: COMPLETED
Start: 2024-05-20 | End: 2024-05-20

## 2024-05-20 RX ORDER — BUPIVACAINE HYDROCHLORIDE 2.5 MG/ML
INJECTION, SOLUTION EPIDURAL; INFILTRATION; INTRACAUDAL
Status: DISCONTINUED | OUTPATIENT
Start: 2024-05-20 | End: 2024-05-20 | Stop reason: HOSPADM

## 2024-05-20 RX ORDER — KETOROLAC TROMETHAMINE 30 MG/ML
INJECTION, SOLUTION INTRAMUSCULAR; INTRAVENOUS AS NEEDED
Status: DISCONTINUED | OUTPATIENT
Start: 2024-05-20 | End: 2024-05-20 | Stop reason: SURG

## 2024-05-20 RX ORDER — MIDAZOLAM HYDROCHLORIDE 2 MG/2ML
INJECTION, SOLUTION INTRAMUSCULAR; INTRAVENOUS AS NEEDED
Status: DISCONTINUED | OUTPATIENT
Start: 2024-05-20 | End: 2024-05-20 | Stop reason: SURG

## 2024-05-20 RX ORDER — ACETAMINOPHEN 325 MG/1
650 TABLET ORAL ONCE AS NEEDED
Status: COMPLETED | OUTPATIENT
Start: 2024-05-20 | End: 2024-05-20

## 2024-05-20 RX ORDER — OXYCODONE AND ACETAMINOPHEN 5; 325 MG/1; MG/1
1 TABLET ORAL EVERY 4 HOURS PRN
Qty: 12 TABLET | Refills: 0 | Status: SHIPPED | OUTPATIENT
Start: 2024-05-20 | End: 2024-12-30 | Stop reason: ALTCHOICE

## 2024-05-20 RX ORDER — ROCURONIUM BROMIDE 10 MG/ML
INJECTION, SOLUTION INTRAVENOUS AS NEEDED
Status: DISCONTINUED | OUTPATIENT
Start: 2024-05-20 | End: 2024-05-20 | Stop reason: SURG

## 2024-05-20 RX ORDER — SODIUM CHLORIDE, SODIUM LACTATE, POTASSIUM CHLORIDE, CALCIUM CHLORIDE 600; 310; 30; 20 MG/100ML; MG/100ML; MG/100ML; MG/100ML
INJECTION, SOLUTION INTRAVENOUS CONTINUOUS
Status: DISCONTINUED | OUTPATIENT
Start: 2024-05-20 | End: 2024-05-20 | Stop reason: HOSPADM

## 2024-05-20 RX ORDER — OXYCODONE HYDROCHLORIDE 5 MG/1
TABLET ORAL
Status: DISCONTINUED
Start: 2024-05-20 | End: 2024-05-20 | Stop reason: WASHOUT

## 2024-05-20 RX ORDER — LIDOCAINE HCL/PF 100 MG/5ML
SYRINGE (ML) INTRAVENOUS AS NEEDED
Status: DISCONTINUED | OUTPATIENT
Start: 2024-05-20 | End: 2024-05-20 | Stop reason: SURG

## 2024-05-20 RX ORDER — PROPOFOL 10 MG/ML
INJECTION, EMULSION INTRAVENOUS AS NEEDED
Status: DISCONTINUED | OUTPATIENT
Start: 2024-05-20 | End: 2024-05-20 | Stop reason: SURG

## 2024-05-20 RX ORDER — ONDANSETRON HYDROCHLORIDE 2 MG/ML
INJECTION, SOLUTION INTRAVENOUS
Status: COMPLETED
Start: 2024-05-20 | End: 2024-05-20

## 2024-05-20 RX ORDER — FENTANYL CITRATE 50 UG/ML
INJECTION, SOLUTION INTRAMUSCULAR; INTRAVENOUS AS NEEDED
Status: DISCONTINUED | OUTPATIENT
Start: 2024-05-20 | End: 2024-05-20 | Stop reason: SURG

## 2024-05-20 RX ORDER — ONDANSETRON HYDROCHLORIDE 2 MG/ML
INJECTION, SOLUTION INTRAVENOUS AS NEEDED
Status: DISCONTINUED | OUTPATIENT
Start: 2024-05-20 | End: 2024-05-20 | Stop reason: SURG

## 2024-05-20 RX ORDER — DEXAMETHASONE SODIUM PHOSPHATE 4 MG/ML
INJECTION, SOLUTION INTRA-ARTICULAR; INTRALESIONAL; INTRAMUSCULAR; INTRAVENOUS; SOFT TISSUE AS NEEDED
Status: DISCONTINUED | OUTPATIENT
Start: 2024-05-20 | End: 2024-05-20 | Stop reason: SURG

## 2024-05-20 RX ORDER — HYDROMORPHONE HYDROCHLORIDE 1 MG/ML
0.5 INJECTION, SOLUTION INTRAMUSCULAR; INTRAVENOUS; SUBCUTANEOUS EVERY 5 MIN PRN
Status: DISCONTINUED | OUTPATIENT
Start: 2024-05-20 | End: 2024-05-20 | Stop reason: HOSPADM

## 2024-05-20 RX ADMIN — KETOROLAC TROMETHAMINE 30 MG: 30 INJECTION, SOLUTION INTRAMUSCULAR; INTRAVENOUS at 09:26

## 2024-05-20 RX ADMIN — FENTANYL CITRATE 50 MCG: 50 INJECTION, SOLUTION INTRAMUSCULAR; INTRAVENOUS at 09:42

## 2024-05-20 RX ADMIN — DEXAMETHASONE SODIUM PHOSPHATE 4 MG: 4 INJECTION, SOLUTION INTRAMUSCULAR; INTRAVENOUS at 08:12

## 2024-05-20 RX ADMIN — FENTANYL CITRATE 50 MCG: 50 INJECTION, SOLUTION INTRAMUSCULAR; INTRAVENOUS at 08:06

## 2024-05-20 RX ADMIN — SUGAMMADEX 195 MG: 100 INJECTION, SOLUTION INTRAVENOUS at 09:33

## 2024-05-20 RX ADMIN — ACETAMINOPHEN 650 MG: 325 TABLET ORAL at 11:30

## 2024-05-20 RX ADMIN — FENTANYL CITRATE 100 MCG: 50 INJECTION, SOLUTION INTRAMUSCULAR; INTRAVENOUS at 07:39

## 2024-05-20 RX ADMIN — CEFAZOLIN 2 G: 2 INJECTION, POWDER, FOR SOLUTION INTRAMUSCULAR; INTRAVENOUS at 07:50

## 2024-05-20 RX ADMIN — LIDOCAINE HYDROCHLORIDE 100 MG: 20 INJECTION, SOLUTION INTRAVENOUS at 07:39

## 2024-05-20 RX ADMIN — ROCURONIUM BROMIDE 80 MG: 10 INJECTION, SOLUTION INTRAVENOUS at 07:40

## 2024-05-20 RX ADMIN — FENTANYL CITRATE 100 MCG: 50 INJECTION, SOLUTION INTRAMUSCULAR; INTRAVENOUS at 08:17

## 2024-05-20 RX ADMIN — MIDAZOLAM HYDROCHLORIDE 2 MG: 1 INJECTION, SOLUTION INTRAMUSCULAR; INTRAVENOUS at 07:28

## 2024-05-20 RX ADMIN — PROPOFOL 150 MG: 10 INJECTION, EMULSION INTRAVENOUS at 07:39

## 2024-05-20 RX ADMIN — ONDANSETRON HYDROCHLORIDE 4 MG: 2 INJECTION, SOLUTION INTRAVENOUS at 11:14

## 2024-05-20 RX ADMIN — SODIUM CHLORIDE, POTASSIUM CHLORIDE, SODIUM LACTATE AND CALCIUM CHLORIDE: 600; 310; 30; 20 INJECTION, SOLUTION INTRAVENOUS at 06:58

## 2024-05-20 RX ADMIN — ONDANSETRON 4 MG: 2 INJECTION INTRAMUSCULAR; INTRAVENOUS at 11:14

## 2024-05-20 RX ADMIN — ONDANSETRON HYDROCHLORIDE 4 MG: 2 INJECTION, SOLUTION INTRAMUSCULAR; INTRAVENOUS at 08:12

## 2024-05-20 ASSESSMENT — ENCOUNTER SYMPTOMS: DYSRHYTHMIAS: 1

## 2024-05-20 NOTE — OP NOTE
PreOp Dx: Pre-op Diagnosis     * Reducible right inguinal hernia [K40.90]     * Incarcerated umbilical hernia and incarcerated ventral hernia[K42.9]  PostOp Dx: Post-op Diagnosis     * Incarcerated umbilical and ventral hernias [K42.9] (5 cm total fascial defects)  Procedure: Procedure(s):  Laparoscopy, robotic repair incarcerated umbilical and ventral hernias with preperitoneal 12 cm in diameter Parietene DS mesh (fascial defects 5 cm total)  - Wound Class: Clean - Incision Closure: Deep and Superficial Layers  Surgeon: Surgeon(s) and Role:     * Randolph Stock MD - Primary  Assistant: MANUEL Martínez  Anesthesia: General with ET tube, local with 0.25% plain marcaine  EBL: Minimal  Specimen: * No specimens  Complications: None  To PACU in satisfactory condition.     Procedure description:  Gerda marked the operative sites and reviewed the procedure to be performed preoperatively in the holding area before sedation was administered. Chano did receive intravenous antibiotics preoperatively.  Once upon the operating table a timeout was held and Chano was identified, as were the operative sites and the operation to be performed.  The abdomen was prepped and draped in the usual sterile fashion using ChloraPrep solution.  An 8 mm incision was made along the right anterior axillary line at the umbilical level.  The abdominal wall was elevated and a Veress needle was inserted into the correct position on the first attempt.  Aspiration from the Veress needle revealed no gas or fluid, and a saline drop test revealed the Veress needle be in good position.  A pneumoperitoneum was established without difficulty to 15 mmHg, opening pressure being 4 mmHg. The Veress needle was removed and an 8 mm port and laparoscope were passed through this incision in the epigastrium.  Laparoscpopy was performed; neither right nor left inguinal hernias were present.  Under direct vision an 8 mm port was placed in the right lower  quadrant of the abdominal wall, and an 8 mm port was placed in right upper quadrant of the abdominal wall. An 8 mm port was placed at the umbilical level in the left anterior axillary line. The [employer] robot was docked.  A peritoneal reflection was raised appropriately and the hernias and hernia sac were completely reduced.  The umbilical and ventral hernia defects (adjacent to one another) were identified and the adipose contents were reduced.  A 12 cm in diameter round  Parietene DS Composite mesh was sutured into the pre-peritoneal position with a running 2-0 V-Loc suture so that the mesh completely covered both hernia defects with at least a 5 cm overlap of the mesh past the edge of the hernia defect. There was good hemostasis. The peritoneum was reapproximated using a running 2-0 V-Loc suture.  There was no exposure of the bowel to the mesh.  The instruments were removed, the robot undocked, and the ports removed. The skin of all of the incisions was closed using running 3-0 Biosyn subcuticular suture.  Dermabond was applied.  An abdominal binder was placed.  At the conclusion of the case the sponge and instrument counts are correct x2.  There are no complications, Chano tolerated the procedure well and returned to the PACU in satisfactory condition.      Randolph Stock MD  Phone Number: [phone]  5/20/2024  9:40 AM

## 2024-05-20 NOTE — OR SURGEON
Pre-Procedure patient identification:  I am the primary operating surgeon/proceduralist and I have reviewed the applicable pathology reports and radiology studies for this procedure. I have identified the patient on 05/20/24 at 7:18 AM Randolph Stock MD  Phone Number: [phone]

## 2024-05-20 NOTE — ANESTHESIA PREPROCEDURE EVALUATION
Relevant Problems   CARDIOVASCULAR   (+) Cardiac arrhythmia   (+) Coronary artery disease      GASTROINTESTINAL   (+) Fatty liver      Other   (+) COVID-19       Anesthesia ROS/MED HX    Anesthesia History    Previous anesthetics  No family history of anesthetic complications  No history of anesthetic complications  Cardiovascular   CAD  Dysrhythmias   ECG reviewed  GI/Hepatic   liver disease (steatosis)  Endo/Other  Body Habitus: Obese  ROS/MED HX Comments:    ECG: Normal sinus rhythm with sinus arrhythmia   Left axis deviation   Abnormal ECG   When compared with ECG of 25-APR-2024 17:04,   No significant change was found   Confirmed by Rehana Vang ([id number]) on 5/16/2024          Past Surgical History:   Procedure Laterality Date    COLONOSCOPY      HAND SURGERY Right     50 years ago    KNEE ARTHROSCOPY Right 2022    meniscal and tendon repair    WISDOM TOOTH EXTRACTION         Physical Exam    Airway   Mallampati: II   TM distance: >3 FB   Neck ROM: full  Cardiovascular - normal   Rhythm: regular   Rate: normalPulmonary - normal   clear to auscultation  Dental - normal        Anesthesia Plan    Plan: general    Technique: general endotracheal      Airway: oral intubation     instructed to abstain from smoking on day of procedure    Patient did not smoke on day of surgery     Pregnancy status reviewed  2 ASA  Anesthetic plan and risks discussed with: patient  Induction:    intravenous

## 2024-05-20 NOTE — ANESTHESIA PROCEDURE NOTES
Airway  Start Time: 5/20/2024 7:40 AM  Airway not difficult    General Information and Staff    Anesthesiologist: Elma Stanley CRNA  Performed by: Elma Stanley CRNA  Authorized by: Mane Medrano MD      Indications and Patient Condition  Indications for airway management: anesthesia  Sedation level: deep  Preoxygenated: yes  MILS maintained throughout  Mask difficulty assessment: 1 - vent by mask    Final Airway Details  Final airway type: endotracheal airway      Successful airway: ETT  Cuffed: yes   Successful intubation technique: direct laryngoscopy  Facilitating devices/methods: intubating stylet  Endotracheal tube insertion site: oral  Blade: Efrain  Blade size: #4  ETT size (mm): 7.5  Cormack-Lehane Classification: grade I - full view of glottis  Placement verified by: chest auscultation   Measured from: teeth  ETT to teeth (cm): 22  Number of attempts at approach: 1  Number of other approaches attempted: 0  Atraumatic airway insertion

## 2024-05-20 NOTE — ANESTHESIOLOGIST PRE-PROCEDURE ATTESTATION
Pre-Procedure Patient Identification:  I am the Primary Anesthesiologist and have identified the patient on 05/20/24 at 6:46 AM.   I have confirmed the procedure(s) will be performed by the following surgeon/proceduralist Randolph Stock MD.

## 2024-05-20 NOTE — ANESTHESIA POSTPROCEDURE EVALUATION
Patient: Chano Leon    Procedure Summary       Date: 05/20/24 Room / Location:  [facility] OR [facility] / [facility]    Anesthesia Start: 0730 Anesthesia Stop: 0954    Procedure: HERNIA UMBILICAL REPAIR  ROBOTIC, LAPAROSCOPY (Abdomen) Diagnosis:       Reducible right inguinal hernia      Reducible umbilical hernia      (Reducible right inguinal hernia [K40.90])      (Reducible umbilical hernia [K42.9])    Surgeons: Randolph Stock MD Responsible Provider: Mane Medrano MD    Anesthesia Type: general ASA Status: 2            Anesthesia Type: general  PACU Vitals  5/20/2024 0946 - 5/20/2024 1046        5/20/2024  0952 5/20/2024  1000 5/20/2024  1015 5/20/2024  1045    BP: 124/63 152/78 130/68 134/65    Temp: 36.8 °C (98.2 °F) -- -- --    Pulse: 65 61 61 58    Resp: 18 18 17 18    SpO2: 92 % 96 % 89 % 93 %              Anesthesia Post Evaluation    Pain management: adequate  Patient location during evaluation: PACU  Patient participation: complete - patient participated  Level of consciousness: awake and alert  Cardiovascular status: acceptable  Airway Patency: adequate  Respiratory status: acceptable  Hydration status: acceptable  Anesthetic complications: no

## 2024-05-21 NOTE — PROGRESS NOTES
Surgery:  I spoke with Al on the phone and he is doing well.  He denies nausea or vomiting and he is tolerating his diet and ambulating.  I reviewed the surgical procedure findings with him.  I will see how in the office in the next week or 2 and he will call me sooner if there are any problems or concerns.

## 2024-05-29 ENCOUNTER — TELEPHONE (OUTPATIENT)
Dept: SURGERY | Facility: CLINIC | Age: 64
End: 2024-05-29
Payer: MEDICARE

## 2024-05-29 NOTE — TELEPHONE ENCOUNTER
reviewed photos and said that everything looks okay He should continue to take it easy ice and use only tylenol

## 2024-05-29 NOTE — TELEPHONE ENCOUNTER
----- Message from Chano Leon sent at 5/29/2024  3:59 PM EDT -----  Regarding: Photos of my Abs (Surgery last week)  Contact: [phone]  Can u take a look at these ( Should I be concerned)?    Al

## 2024-06-04 ENCOUNTER — OFFICE VISIT (OUTPATIENT)
Dept: SURGERY | Facility: CLINIC | Age: 64
End: 2024-06-04
Payer: MEDICARE

## 2024-06-04 VITALS — HEIGHT: 72 IN | BODY MASS INDEX: 29.16 KG/M2

## 2024-06-04 DIAGNOSIS — Z09 POSTOP CHECK: Primary | ICD-10-CM

## 2024-06-04 PROCEDURE — 99024 POSTOP FOLLOW-UP VISIT: CPT | Performed by: SURGERY

## 2024-06-04 NOTE — PROGRESS NOTES
Patient ID: Chano Leon                              : 1960  MRN: [id number]                                            Visit Date: 2024  Encounter Provider: Danilo Harris  Referring Provider: Dean Steele DO Subjective   Chief Complaint: Status post robotic repair of  incarcerated umbilical hernia and incarcerated ventral hernia with mesh 2024 follow-up exam        Chano Leon is a 64 y.o. old male with a past medical history as noted below who underwent the above-mentioned procedure by Dr. Stock on 2024.  Patient was concerned and did send some pictures of his incisional bruising and therefore I did make arrangements to see the patient today.  He is doing well not having much in the way of pain or discomfort.  There is no fevers, chills, or wound drainage.     Medications:   Current Outpatient Medications:     coenzyme Q10 (COQ10) 100 mg capsule, Take 100 mg by mouth every morning., Disp: , Rfl:     MAGNESIUM CHLORIDE ORAL, Take 1,400 mg by mouth every morning., Disp: , Rfl:     multivitamin tablet, Take 1 tablet by mouth every morning., Disp: , Rfl:     NON FORMULARY MEDICATION REQUEST, Omega 3/fish oil 1000mg 1 capsule po BID, Disp: , Rfl:     NON FORMULARY MEDICATION REQUEST, Take 12 mg by mouth every evening. Astaxanthin 1 tablet po each evening, Disp: , Rfl:     NON FORMULARY MEDICATION REQUEST, Trianex 0.05% apply to skin daily prn, Disp: , Rfl:     NON FORMULARY MEDICATION REQUEST, Cholestoff plus 1 tab po BID 900mg, Disp: , Rfl:     oxyCODONE-acetaminophen (PERCOCET) 5-325 mg per tablet, Take 1 tablet by mouth every 4 (four) hours as needed for severe pain for up to 12 doses., Disp: 12 tablet, Rfl: 0    rosuvastatin (CRESTOR) 5 mg tablet, Take 1 tablet (5 mg total) by mouth daily. (Patient taking differently: Take 5 mg by mouth every evening.), Disp: 90 tablet, Rfl: 1    selenium 200 mcg capsule, Take 200 mcg by mouth every evening., Disp: , Rfl:      Past Medical History:  has a past medical history of Coronary artery disease, COVID-19, Exercise-induced tachycardia, Fatty liver, and Gastroenteritis.    He has no past medical history of History of transfusion or Sleep apnea.    Objective   Vitals: Visit Vitals  Ht 1.829 m (6')   BMI 29.16 kg/m²     Physical Exam    On exam his incisions are healing well although each has a small linear eschar.  There is some surrounding ecchymosis but no erythema or evidence of infection.  The repair feels solid.  Abdomen is otherwise benign.    Assessment/plan: Stable postoperative course/exam following robotic repair of incarcerated umbilical and ventral hernia with mesh.  Postop restrictions and wound care instructions were again reviewed with Al today in the office.  He does not need to return or but will call with any problems or questions.      Danilo Harris MD

## 2024-06-04 NOTE — LETTER
2024     Dean Steele DO  [address]  [place] [facility] [address]    Patient: Chano Leon  YOB: 1960  Date of Visit: 2024      Dear Dr. Steele:    Thank you for referring Chano Leon to me for evaluation. Below are my notes for this consultation.    If you have questions, please do not hesitate to call me. I look forward to following your patient along with you.         Sincerely,        Danilo Harris MD        CC: No Recipients    Danilo Harris MD  2024  2:40 PM  Sign when Signing Visit    Patient ID: Chano Leon                              : 1960  MRN: [id number]                                            Visit Date: 2024  Encounter Provider: Danilo Harris  Referring Provider: Dean Steele DO    Subjective  Chief Complaint: Status post robotic repair of  incarcerated umbilical hernia and incarcerated ventral hernia with mesh 2024 follow-up exam        Chano Leon is a 64 y.o. old male with a past medical history as noted below who underwent the above-mentioned procedure by Dr. Stock on 2024.  Patient was concerned and did send some pictures of his incisional bruising and therefore I did make arrangements to see the patient today.  He is doing well not having much in the way of pain or discomfort.  There is no fevers, chills, or wound drainage.     Medications:   Current Outpatient Medications:   •  coenzyme Q10 (COQ10) 100 mg capsule, Take 100 mg by mouth every morning., Disp: , Rfl:   •  MAGNESIUM CHLORIDE ORAL, Take 1,400 mg by mouth every morning., Disp: , Rfl:   •  multivitamin tablet, Take 1 tablet by mouth every morning., Disp: , Rfl:   •  NON FORMULARY MEDICATION REQUEST, Omega 3/fish oil 1000mg 1 capsule po BID, Disp: , Rfl:   •  NON FORMULARY MEDICATION REQUEST, Take 12 mg by mouth every evening. Astaxanthin 1 tablet po each evening, Disp: , Rfl:   •  NON FORMULARY MEDICATION REQUEST,  Trianex 0.05% apply to skin daily prn, Disp: , Rfl:   •  NON FORMULARY MEDICATION REQUEST, Cholestoff plus 1 tab po BID 900mg, Disp: , Rfl:   •  oxyCODONE-acetaminophen (PERCOCET) 5-325 mg per tablet, Take 1 tablet by mouth every 4 (four) hours as needed for severe pain for up to 12 doses., Disp: 12 tablet, Rfl: 0  •  rosuvastatin (CRESTOR) 5 mg tablet, Take 1 tablet (5 mg total) by mouth daily. (Patient taking differently: Take 5 mg by mouth every evening.), Disp: 90 tablet, Rfl: 1  •  selenium 200 mcg capsule, Take 200 mcg by mouth every evening., Disp: , Rfl:     Past Medical History:  has a past medical history of Coronary artery disease, COVID-19, Exercise-induced tachycardia, Fatty liver, and Gastroenteritis.    He has no past medical history of History of transfusion or Sleep apnea.    Objective  Vitals: Visit Vitals  Ht 1.829 m (6')   BMI 29.16 kg/m²     Physical Exam    On exam his incisions are healing well although each has a small linear eschar.  There is some surrounding ecchymosis but no erythema or evidence of infection.  The repair feels solid.  Abdomen is otherwise benign.    Assessment/plan: Stable postoperative course/exam following robotic repair of incarcerated umbilical and ventral hernia with mesh.  Postop restrictions and wound care instructions were again reviewed with Al today in the office.  He does not need to return or but will call with any problems or questions.      Danilo Harris MD

## 2024-06-18 ENCOUNTER — OFFICE VISIT (OUTPATIENT)
Dept: SURGERY | Facility: CLINIC | Age: 64
End: 2024-06-18
Payer: MEDICARE

## 2024-06-18 VITALS
HEIGHT: 72 IN | TEMPERATURE: 97.9 F | SYSTOLIC BLOOD PRESSURE: 136 MMHG | WEIGHT: 217.4 LBS | BODY MASS INDEX: 29.45 KG/M2 | OXYGEN SATURATION: 98 % | HEART RATE: 71 BPM | DIASTOLIC BLOOD PRESSURE: 84 MMHG

## 2024-06-18 DIAGNOSIS — Z48.815 ENCOUNTER FOR SURGICAL AFTERCARE FOLLOWING SURGERY OF DIGESTIVE SYSTEM: Primary | ICD-10-CM

## 2024-06-18 PROBLEM — Z48.89 POSTOPERATIVE VISIT: Status: ACTIVE | Noted: 2024-06-18

## 2024-06-18 PROBLEM — Z48.89 POSTOPERATIVE VISIT: Status: RESOLVED | Noted: 2024-06-18 | Resolved: 2024-06-18

## 2024-06-18 PROCEDURE — 99213 OFFICE O/P EST LOW 20 MIN: CPT | Performed by: SURGERY

## 2024-06-18 ASSESSMENT — ENCOUNTER SYMPTOMS
CHILLS: 0
ABDOMINAL DISTENTION: 0
COLOR CHANGE: 0
FEVER: 0
ABDOMINAL PAIN: 0

## 2024-06-18 NOTE — LETTER
2024     Dean Steele DO  [address]  [place] [address]    Patient: Chano Leon  YOB: 1960  Date of Visit: 2024      Dear Dr. Steele:    Thank you for referring Chano Leon to me for evaluation. Below are my notes for this consultation.    If you have questions, please do not hesitate to call me. I look forward to following your patient along with you.         Sincerely,        Randolph Stock MD        CC: No Recipients    Randolph Stock MD  2024 12:12 PM  Sign when Signing Visit       Surgery Office Note       Patient: Chano Leon  MRN: [id number]  1960    Visit Date: 2024  Encounter Provider: Randolph Stock  Referring Provider: Dean Steele DO    Reason for visit:   Chief Complaint   Patient presents with   • Follow-up      HPI   Chano Leon is a 64 y.o. male who presents for postoperative visit.  Al has no complaints.  He is tolerating his diet well.  Al denies fever, chills, abdominal pain or drainage from the incisions.  He states his ecchymosis has resolved.  I reviewed the surgical procedure and findings with Al.      Past Medical History:   Diagnosis Date   • Coronary artery disease     coronary CTA 22:  Multivessel coronary artery disease that is mild to moderate in severity and likely nonobstructive; CACS 126   • COVID-19     ,    • Exercise-induced tachycardia     pt states resolved   • Fatty liver    • Gastroenteritis      Past Surgical History:   Procedure Laterality Date   • COLONOSCOPY     • HAND SURGERY Right     50 years ago   • KNEE ARTHROSCOPY Right     meniscal and tendon repair   • UMBILICAL HERNIA REPAIR  2024   • WISDOM TOOTH EXTRACTION       Social History     Social History Narrative   • Not on file     Family History   Problem Relation Age of Onset   • Lung disease Biological Mother    • Heart disease Biological Father         CABG in his 50s;  at age  70   • COPD Biological Father    • Heart disease Biological Brother         CABG at age 58   • Hyperlipidemia Biological Brother    • Hypertension Biological Brother    • No Known Problems Biological Sister    • Cancer Maternal Grandmother          in her mid 60s   • Heart attack Maternal Grandfather         multiple heart attacks starting at age 50   • Heart disease Maternal Grandfather         CHF -  in his 70s   • No Known Problems Biological Brother    • Hypertension Biological Brother    • Diabetes Biological Brother    • Autoimmune disease Biological Sister    • Crohn's disease Biological Sister    • Autoimmune disease Biological Brother    • No Known Problems Biological Sister      Patient has no known allergies.  Current Outpatient Medications   Medication Sig Dispense Refill   • coenzyme Q10 (COQ10) 100 mg capsule Take 100 mg by mouth every morning.     • MAGNESIUM CHLORIDE ORAL Take 1,400 mg by mouth every morning.     • multivitamin tablet Take 1 tablet by mouth every morning.     • NON FORMULARY MEDICATION REQUEST Omega 3/fish oil 1000mg 1 capsule po BID     • NON FORMULARY MEDICATION REQUEST Take 12 mg by mouth every evening. Astaxanthin 1 tablet po each evening     • NON FORMULARY MEDICATION REQUEST Trianex 0.05% apply to skin daily prn     • NON FORMULARY MEDICATION REQUEST Cholestoff plus 1 tab po BID  900mg     • rosuvastatin (CRESTOR) 5 mg tablet Take 1 tablet (5 mg total) by mouth daily. (Patient taking differently: Take 5 mg by mouth every evening.) 90 tablet 1   • selenium 200 mcg capsule Take 200 mcg by mouth every evening.     • oxyCODONE-acetaminophen (PERCOCET) 5-325 mg per tablet Take 1 tablet by mouth every 4 (four) hours as needed for severe pain for up to 12 doses. (Patient not taking: Reported on 2024) 12 tablet 0     No current facility-administered medications for this visit.       Review of Systems   Constitutional:  Negative for chills and fever.   Cardiovascular:   Negative for chest pain.   Gastrointestinal:  Negative for abdominal distention and abdominal pain.   Skin:  Negative for color change.     Objective   Vitals:    06/18/24 1059   BP: 136/84   Pulse: 71   Temp: 36.6 °C (97.9 °F)   SpO2: 98%       Physical Exam  Vitals and nursing note reviewed.   Constitutional:       General: He is not in acute distress.     Appearance: Normal appearance. He is not ill-appearing, toxic-appearing or diaphoretic.   Eyes:      Conjunctiva/sclera: Conjunctivae normal.   Pulmonary:      Effort: Pulmonary effort is normal. No respiratory distress.   Abdominal:      General: Abdomen is flat. There is no distension.      Palpations: Abdomen is soft. There is no mass.      Tenderness: There is no abdominal tenderness. There is no guarding.      Hernia: No hernia is present.   Skin:     General: Skin is warm and dry.      Findings: No bruising or erythema.      Comments: The incisions are clean, dry and intact.  There are no signs of infection with only very minimal ecchymosis which is resolving around the incisions.   Neurological:      Mental Status: He is alert and oriented to person, place, and time.           Labs  Lab Results   Component Value Date    WBC 5.10 05/02/2024    HGB 16.0 05/02/2024    HCT 48.0 05/02/2024    MCV 96.4 05/02/2024     05/02/2024       Lab Results   Component Value Date    GLUCOSE 92 05/02/2024    CALCIUM 9.5 05/02/2024     05/02/2024    K 4.1 05/02/2024    CO2 27 05/02/2024     (H) 05/02/2024    BUN 20 05/02/2024    CREATININE 1.0 05/02/2024       Lab Results   Component Value Date    ALT 34 05/02/2024    AST 23 05/02/2024    ALKPHOS 56 05/02/2024    BILITOT 0.7 05/02/2024        Imaging  Not applicable   Assessment: Satisfactory postoperative course.    Plan: Al will will call me if there are any problems or concerns.     Randolph Stock MD  6/18/2024

## 2024-06-18 NOTE — PROGRESS NOTES
Surgery Office Note       Patient: Chano Leon  MRN: [id number]  1960    Visit Date: 2024  Encounter Provider: Randolph Stock  Referring Provider: Dean Steele DO    Reason for visit:   Chief Complaint   Patient presents with    Follow-up      HPI   Chano Leon is a 64 y.o. male who presents for postoperative visit.  Al has no complaints.  He is tolerating his diet well.  Al denies fever, chills, abdominal pain or drainage from the incisions.  He states his ecchymosis has resolved.  I reviewed the surgical procedure and findings with Al.      Past Medical History:   Diagnosis Date    Coronary artery disease     coronary CTA 22:  Multivessel coronary artery disease that is mild to moderate in severity and likely nonobstructive; CACS 126    COVID-19     ,     Exercise-induced tachycardia     pt states resolved    Fatty liver     Gastroenteritis      Past Surgical History:   Procedure Laterality Date    COLONOSCOPY      HAND SURGERY Right     50 years ago    KNEE ARTHROSCOPY Right     meniscal and tendon repair    UMBILICAL HERNIA REPAIR  2024    WISDOM TOOTH EXTRACTION       Social History     Social History Narrative    Not on file     Family History   Problem Relation Age of Onset    Lung disease Biological Mother     Heart disease Biological Father         CABG in his 50s;  at age 70    COPD Biological Father     Heart disease Biological Brother         CABG at age 58    Hyperlipidemia Biological Brother     Hypertension Biological Brother     No Known Problems Biological Sister     Cancer Maternal Grandmother          in her mid 60s    Heart attack Maternal Grandfather         multiple heart attacks starting at age 50    Heart disease Maternal Grandfather         CHF -  in his 70s    No Known Problems Biological Brother     Hypertension Biological Brother     Diabetes Biological Brother     Autoimmune disease Biological Sister     Crohn's  disease Biological Sister     Autoimmune disease Biological Brother     No Known Problems Biological Sister      Patient has no known allergies.  Current Outpatient Medications   Medication Sig Dispense Refill    coenzyme Q10 (COQ10) 100 mg capsule Take 100 mg by mouth every morning.      MAGNESIUM CHLORIDE ORAL Take 1,400 mg by mouth every morning.      multivitamin tablet Take 1 tablet by mouth every morning.      NON FORMULARY MEDICATION REQUEST Omega 3/fish oil 1000mg 1 capsule po BID      NON FORMULARY MEDICATION REQUEST Take 12 mg by mouth every evening. Astaxanthin 1 tablet po each evening      NON FORMULARY MEDICATION REQUEST Trianex 0.05% apply to skin daily prn      NON FORMULARY MEDICATION REQUEST Cholestoff plus 1 tab po BID  900mg      rosuvastatin (CRESTOR) 5 mg tablet Take 1 tablet (5 mg total) by mouth daily. (Patient taking differently: Take 5 mg by mouth every evening.) 90 tablet 1    selenium 200 mcg capsule Take 200 mcg by mouth every evening.      oxyCODONE-acetaminophen (PERCOCET) 5-325 mg per tablet Take 1 tablet by mouth every 4 (four) hours as needed for severe pain for up to 12 doses. (Patient not taking: Reported on 6/18/2024) 12 tablet 0     No current facility-administered medications for this visit.       Review of Systems   Constitutional:  Negative for chills and fever.   Cardiovascular:  Negative for chest pain.   Gastrointestinal:  Negative for abdominal distention and abdominal pain.   Skin:  Negative for color change.     Objective   Vitals:    06/18/24 1059   BP: 136/84   Pulse: 71   Temp: 36.6 °C (97.9 °F)   SpO2: 98%       Physical Exam  Vitals and nursing note reviewed.   Constitutional:       General: He is not in acute distress.     Appearance: Normal appearance. He is not ill-appearing, toxic-appearing or diaphoretic.   Eyes:      Conjunctiva/sclera: Conjunctivae normal.   Pulmonary:      Effort: Pulmonary effort is normal. No respiratory distress.   Abdominal:       General: Abdomen is flat. There is no distension.      Palpations: Abdomen is soft. There is no mass.      Tenderness: There is no abdominal tenderness. There is no guarding.      Hernia: No hernia is present.   Skin:     General: Skin is warm and dry.      Findings: No bruising or erythema.      Comments: The incisions are clean, dry and intact.  There are no signs of infection with only very minimal ecchymosis which is resolving around the incisions.   Neurological:      Mental Status: He is alert and oriented to person, place, and time.           Labs  Lab Results   Component Value Date    WBC 5.10 05/02/2024    HGB 16.0 05/02/2024    HCT 48.0 05/02/2024    MCV 96.4 05/02/2024     05/02/2024       Lab Results   Component Value Date    GLUCOSE 92 05/02/2024    CALCIUM 9.5 05/02/2024     05/02/2024    K 4.1 05/02/2024    CO2 27 05/02/2024     (H) 05/02/2024    BUN 20 05/02/2024    CREATININE 1.0 05/02/2024       Lab Results   Component Value Date    ALT 34 05/02/2024    AST 23 05/02/2024    ALKPHOS 56 05/02/2024    BILITOT 0.7 05/02/2024        Imaging  Not applicable   Assessment: Satisfactory postoperative course.    Plan: Al will will call me if there are any problems or concerns.     Randolph Stock MD  6/18/2024

## 2024-08-30 RX ORDER — ROSUVASTATIN CALCIUM 5 MG/1
5 TABLET, COATED ORAL EVERY EVENING
Qty: 90 TABLET | Refills: 3 | Status: SHIPPED | OUTPATIENT
Start: 2024-08-30 | End: 2025-08-30

## 2024-11-08 ENCOUNTER — TELEPHONE (OUTPATIENT)
Dept: PRIMARY CARE | Facility: CLINIC | Age: 64
End: 2024-11-08
Payer: MEDICARE

## 2024-11-08 RX ORDER — VALACYCLOVIR HYDROCHLORIDE 1 G/1
1000 TABLET, FILM COATED ORAL 3 TIMES DAILY
Qty: 15 TABLET | Refills: 0 | Status: SHIPPED | OUTPATIENT
Start: 2024-11-08

## 2024-11-08 NOTE — TELEPHONE ENCOUNTER
Pt has a fever blister on his lower left side of lip, pt states he gets them off and on for about 60 years but gets them a lot less frequently now. He is wondering if he should have a prescription called in or if not what he should use OTC?    [facility] on file.

## 2024-12-30 ENCOUNTER — OFFICE VISIT (OUTPATIENT)
Dept: PRIMARY CARE | Facility: CLINIC | Age: 64
End: 2024-12-30
Payer: MEDICARE

## 2024-12-30 VITALS
TEMPERATURE: 97.3 F | DIASTOLIC BLOOD PRESSURE: 72 MMHG | WEIGHT: 217 LBS | HEART RATE: 76 BPM | HEIGHT: 72 IN | OXYGEN SATURATION: 98 % | RESPIRATION RATE: 18 BRPM | BODY MASS INDEX: 29.39 KG/M2 | SYSTOLIC BLOOD PRESSURE: 134 MMHG

## 2024-12-30 DIAGNOSIS — J40 BRONCHITIS: Primary | ICD-10-CM

## 2024-12-30 PROBLEM — E78.9 LIPID DISORDER: Status: ACTIVE | Noted: 2024-12-30

## 2024-12-30 PROBLEM — E66.9 OBESITY: Status: ACTIVE | Noted: 2023-02-22

## 2024-12-30 PROBLEM — E78.00 PURE HYPERCHOLESTEROLEMIA: Status: ACTIVE | Noted: 2023-02-22

## 2024-12-30 PROCEDURE — 99213 OFFICE O/P EST LOW 20 MIN: CPT | Performed by: FAMILY MEDICINE

## 2024-12-30 RX ORDER — METHYLPREDNISOLONE 4 MG/1
TABLET ORAL
Qty: 21 TABLET | Refills: 0 | Status: SHIPPED | OUTPATIENT
Start: 2024-12-30 | End: 2025-01-06

## 2024-12-30 RX ORDER — DOXYCYCLINE HYCLATE 100 MG
100 TABLET ORAL 2 TIMES DAILY
Qty: 14 TABLET | Refills: 0 | Status: SHIPPED | OUTPATIENT
Start: 2024-12-30 | End: 2025-01-06

## 2024-12-30 ASSESSMENT — ENCOUNTER SYMPTOMS
ABDOMINAL PAIN: 0
PALPITATIONS: 0
SPEECH DIFFICULTY: 0
WHEEZING: 0
RHINORRHEA: 1
DIARRHEA: 0
SORE THROAT: 0
SHORTNESS OF BREATH: 0
CONSTIPATION: 0
FATIGUE: 0
JOINT SWELLING: 0
ARTHRALGIAS: 0
DIZZINESS: 0
CHILLS: 0
BACK PAIN: 0
EYE PAIN: 0
CHEST TIGHTNESS: 0
DIFFICULTY URINATING: 0
PSYCHIATRIC NEGATIVE: 1
NAUSEA: 0
COUGH: 1
FREQUENCY: 0
FEVER: 0
EYE DISCHARGE: 0

## 2024-12-30 NOTE — PROGRESS NOTES
Daily Progress Note      Subjective      Patient ID: Chano Leon is a 64 y.o. male.    HPI  Cough, exposures, int fever, sputum  Congestion, pnd    The following have been reviewed and updated as appropriate in this visit:   Problems       Review of Systems   Constitutional:  Negative for chills, fatigue and fever.   HENT:  Positive for congestion, postnasal drip and rhinorrhea. Negative for dental problem, ear pain and sore throat.    Eyes:  Negative for pain, discharge and visual disturbance.   Respiratory:  Positive for cough. Negative for chest tightness, shortness of breath and wheezing.    Cardiovascular:  Negative for chest pain and palpitations.   Gastrointestinal:  Negative for abdominal pain, constipation, diarrhea and nausea.   Genitourinary:  Negative for difficulty urinating, frequency and urgency.   Musculoskeletal:  Negative for arthralgias, back pain and joint swelling.   Skin: Negative.    Neurological:  Negative for dizziness, syncope and speech difficulty.   Psychiatric/Behavioral: Negative.         Current Outpatient Medications   Medication Sig Dispense Refill    coenzyme Q10 (COQ10) 100 mg capsule Take 100 mg by mouth every morning.      doxycycline hyclate (VIBRA-TABS) 100 mg tablet Take 1 tablet (100 mg total) by mouth 2 (two) times a day for 7 days. 14 tablet 0    MAGNESIUM CHLORIDE ORAL Take 1,400 mg by mouth every morning.      methylPREDNISolone (MEDROL DOSEPACK) 4 mg tablet Follow package directions. 21 tablet 0    multivitamin tablet Take 1 tablet by mouth every morning.      NON FORMULARY MEDICATION REQUEST Omega 3/fish oil 1000mg 1 capsule po BID      NON FORMULARY MEDICATION REQUEST Take 12 mg by mouth every evening. Astaxanthin 1 tablet po each evening      NON FORMULARY MEDICATION REQUEST Trianex 0.05% apply to skin daily prn      NON FORMULARY MEDICATION REQUEST Cholestoff plus 1 tab po BID  900mg      rosuvastatin (CRESTOR) 5 mg tablet Take 1 tablet (5 mg total) by  mouth every evening. 90 tablet 3    selenium 200 mcg capsule Take 200 mcg by mouth every evening.      valACYclovir (VALTREX) 1 gram tablet Take 1 tablet (1,000 mg total) by mouth 3 (three) times a day for 5 days. 15 tablet 0    oxyCODONE-acetaminophen (PERCOCET) 5-325 mg per tablet Take 1 tablet by mouth every 4 (four) hours as needed for severe pain for up to 12 doses. (Patient not taking: Reported on 2024) 12 tablet 0     No current facility-administered medications for this visit.     Past Medical History:   Diagnosis Date    Coronary artery disease     coronary CTA 22:  Multivessel coronary artery disease that is mild to moderate in severity and likely nonobstructive; CACS 126    COVID-19     ,     Exercise-induced tachycardia     pt states resolved    Fatty liver     Gastroenteritis      Family History   Problem Relation Name Age of Onset    Lung disease Biological Mother      Heart disease Biological Father          CABG in his 50s;  at age 70    COPD Biological Father      Heart disease Biological Brother Christiano         CABG at age 58    Hyperlipidemia Biological Brother Christiano     Hypertension Biological Brother Christiano     No Known Problems Biological Sister Clementine     Cancer Maternal Grandmother           in her mid 60s    Heart attack Maternal Grandfather          multiple heart attacks starting at age 50    Heart disease Maternal Grandfather          CHF -  in his 70s    No Known Problems Biological Brother Markus     Hypertension Biological Brother Jose     Diabetes Biological Brother Jose     Autoimmune disease Biological Sister Sherine     Crohn's disease Biological Sister Sherine     Autoimmune disease Biological Brother Jose Alberto     No Known Problems Biological Sister Nitza      Past Surgical History   Procedure Laterality Date    Colonoscopy      Hand surgery Right     50 years ago    HERNIA UMBILICAL REPAIR  ROBOTIC, LAPAROSCOPY N/A 2024    Performed by Montrell  Randolph BARRON MD at  [facility]""    Knee arthroscopy Right 2022    meniscal and tendon repair    Umbilical hernia repair  05/20/2024    [place] tooth extraction       Social History     Socioeconomic History    Marital status:      Spouse name: Not on file    Number of children: Not on file    Years of education: Not on file    Highest education level: Not on file   Occupational History    Not on file   Tobacco Use    Smoking status: Never     Passive exposure: Never    Smokeless tobacco: Never   Vaping Use    Vaping status: Never Used   Substance and Sexual Activity    Alcohol use: Yes     Comment: 2 drinks weekly    Drug use: Never    Sexual activity: Yes     Partners: Female     Birth control/protection: None   Other Topics Concern    Not on file   Social History Narrative    Not on file     Social Drivers of Health     Financial Resource Strain: Not on file   Food Insecurity: No Food Insecurity (3/12/2022)    Hunger Vital Sign     Worried About Running Out of Food in the Last Year: Never true     Ran Out of Food in the Last Year: Never true   Transportation Needs: Not on file   Physical Activity: Not on file   Stress: Not on file   Social Connections: Not on file   Intimate Partner Violence: Not on file   Housing Stability: Not on file     No Known Allergies    Objective   No new labs.    Physical Exam  Constitutional:       General: He is not in acute distress.     Appearance: Normal appearance. He is well-developed. He is not ill-appearing.   HENT:      Head: Normocephalic and atraumatic.      Right Ear: Tympanic membrane and external ear normal.      Left Ear: Tympanic membrane and external ear normal.      Mouth/Throat:      Mouth: Mucous membranes are moist.   Eyes:      Pupils: Pupils are equal, round, and reactive to light.   Neck:      Thyroid: No thyromegaly.   Cardiovascular:      Rate and Rhythm: Normal rate and regular rhythm.      Heart sounds: Normal heart sounds.   Pulmonary:      Effort:  Pulmonary effort is normal. No respiratory distress.      Breath sounds: Normal breath sounds. No wheezing or rales.   Abdominal:      General: Bowel sounds are normal.      Palpations: Abdomen is soft. There is no mass.      Tenderness: There is no abdominal tenderness. There is no guarding or rebound.      Hernia: No hernia is present.   Musculoskeletal:         General: No deformity. Normal range of motion.      Cervical back: Normal range of motion and neck supple.   Skin:     General: Skin is warm and dry.   Neurological:      Mental Status: He is alert and oriented to person, place, and time.      Cranial Nerves: No cranial nerve deficit.   Psychiatric:         Behavior: Behavior normal.         Thought Content: Thought content normal.         Judgment: Judgment normal.         Assessment/Plan     Problem List Items Addressed This Visit    None  Visit Diagnoses       Bronchitis    -  Primary          No orders of the defined types were placed in this encounter.    Will danette lara protocol  Re-check if no change  T/c cxr    Dean Steele DO  12/30/2024

## 2025-03-17 ENCOUNTER — TELEPHONE (OUTPATIENT)
Dept: PRIMARY CARE | Facility: CLINIC | Age: 65
End: 2025-03-17
Payer: MEDICARE

## 2025-03-17 DIAGNOSIS — I25.85 CHRONIC CORONARY MICROVASCULAR DYSFUNCTION: Primary | ICD-10-CM

## 2025-03-17 DIAGNOSIS — K76.0 FATTY LIVER: ICD-10-CM

## 2025-03-17 DIAGNOSIS — N40.0 BENIGN PROSTATIC HYPERPLASIA, UNSPECIFIED WHETHER LOWER URINARY TRACT SYMPTOMS PRESENT: ICD-10-CM

## 2025-03-21 ENCOUNTER — OFFICE VISIT (OUTPATIENT)
Dept: PRIMARY CARE | Facility: CLINIC | Age: 65
End: 2025-03-21
Payer: MEDICARE

## 2025-03-21 VITALS
BODY MASS INDEX: 29.12 KG/M2 | SYSTOLIC BLOOD PRESSURE: 130 MMHG | OXYGEN SATURATION: 98 % | WEIGHT: 215 LBS | TEMPERATURE: 97.2 F | HEIGHT: 72 IN | RESPIRATION RATE: 18 BRPM | DIASTOLIC BLOOD PRESSURE: 72 MMHG | HEART RATE: 78 BPM

## 2025-03-21 DIAGNOSIS — M25.561 CHRONIC PAIN OF RIGHT KNEE: ICD-10-CM

## 2025-03-21 DIAGNOSIS — Z00.00 ROUTINE ADULT HEALTH MAINTENANCE: Primary | ICD-10-CM

## 2025-03-21 DIAGNOSIS — I25.85 CHRONIC CORONARY MICROVASCULAR DYSFUNCTION: ICD-10-CM

## 2025-03-21 DIAGNOSIS — E78.2 MIXED HYPERLIPIDEMIA: ICD-10-CM

## 2025-03-21 DIAGNOSIS — G89.29 CHRONIC PAIN OF RIGHT KNEE: ICD-10-CM

## 2025-03-21 DIAGNOSIS — R93.1 AGATSTON CAC SCORE 100-199: ICD-10-CM

## 2025-03-21 PROCEDURE — G0438 PPPS, INITIAL VISIT: HCPCS | Performed by: FAMILY MEDICINE

## 2025-03-21 ASSESSMENT — MINI COG
TOTAL SCORE: 5
COMPLETED: YES

## 2025-03-21 ASSESSMENT — VISUAL ACUITY
OS_CC: 20/20
OD_CC: 20/20

## 2025-03-21 ASSESSMENT — PATIENT HEALTH QUESTIONNAIRE - PHQ9: SUM OF ALL RESPONSES TO PHQ9 QUESTIONS 1 & 2: 0

## 2025-03-21 NOTE — PATIENT INSTRUCTIONS
Your Personalized Prevention Plan Services (PPPS)    Preventive Services Checklist (Assumes Average Risk Unless Otherwise Noted):    Health Maintenance Topics with due status: Overdue       Topic Date Due    Zoster Vaccine Never done    Pneumococcal (50 years of age and older) Never done    RSV Vaccine Never done    Influenza Vaccine Never done    COVID-19 Vaccine 09/01/2024     Health Maintenance Topics with due status: Postponed       Topic Postponed Until    Hepatitis C Screening 12/30/2025 (Originally 2/5/1978)     Health Maintenance Topics with due status: Not Due       Topic Last Completion Date    DTaP, Tdap, and Td Vaccines 08/17/2022    Colorectal Cancer Screening 07/20/2023    Medicare Annual Wellness Visit 03/21/2025    Depression Screening 03/21/2025    Falls Risk Screening 03/21/2025     Health Maintenance Topics with due status: Aged Out       Topic Date Due    Meningococcal ACWY Aged Out    RSV <20 months Aged Out    HIB Vaccines Aged Out    Hepatitis B Vaccines Aged Out    IPV Vaccines Aged Out    Meningococcal B Aged Out    HPV Vaccines Aged Out       You May Be Eligible for These Additional Preventive Services   (Assumes Average Risk Unless Otherwise Noted)  Diabetes Screening Any 1 risk factor: hypertension, dyslipidemia, obesity, high glucose; or Any 2 risk factors: >=66yo, overweight, family history diabetes (covered every 6 months)   Hepatitis C Screening Any 1 risk factor: 1) blood transfusion before 1992,   2) current or past injection drug use (annually for high risk; if born between 5226-8625, see above for status).   Vaccine: Hepatitis B As necessary if at-risk: hemophilia, ESRD, diabetes, living with individual infected with hep B, healthcare worker with frequent contact with blood/bodily fluids (series covered once)   Sexually Transmitted Diseases (STDs) As necessary chlamydia, gonorrhea, syphilis, hepatitis B (covered annually)  HIV if any 1 risk factor present: 1)  <14yo or >66yo and at increased risk or 2) 15-66yo and ask for it (covered annually)   Lung Cancer Screening Low dose chest CT if all three risk factors: 1) 50-76yo, 2) smoker or quit within last 15y, 3) >=20 pack years (covered annually).  No results found for this or any previous visit.       Cholesterol Screening No signs or symptoms of cardiovascular disease (screening covered every 5 years).   Prostate Cancer Screening >= 51yo if patient desires test after weighting potential harms and benefits (covered annually)       Health Risk Factors with Personalized Education:  ----------------------------------------------------------------------------------------------------------------------  Controlling Your Cholesterol  Reduce the amount of saturated and trans fat in your diet.  Limit intake of red meat.  Consume only low-fat or non-fat/skim dairy.  Limit fried food.  Cook with vegetable oils.  Reduce your intake of sugary foods, sugary drinks and alcohol.  Eat a diet high in fruit, vegetables and whole grains.  Get protein from fish, poultry and a small portion of nuts.  Stay active.  Try to get at least 90 to 150 minutes of exercise per week.  Try brisk walking, swimming, bicycling or dancing.  Maintain a healthy weight by balancing your diet and exercise.  If you have been prescribed medication, take it regularly and exactly as prescribed. Let your PCP know if you have any problems or questions about your medication.  Its important to know your cholesterol numbers.  When recommended by your PCP, get the cholesterol blood test.  ----------------------------------------------------------------------------------------------------------------------  Reducing Your Risk of Falls  Tell your PCP if any of your medications make you feel tired, dizzy, lightheaded or off-balance.  Maintain coordination, flexibility and balance by ensuring regular physical activity.  Limit alcohol intake to 1 drink per day.  Consider  avoiding all alcohol intake.  Ensure good vision.  Visit an ophthalmologist or optometrist regularly for vision screening or to make sure your glasses / contact lens prescription is correct.  If you need glasses or contacts, wear them.  When you get new glasses or contacts, take time to get used to them.  Do not wear sunglasses or tinted lenses when indoors.  Ensure good hearing.  Have your hearing checked if you are having trouble hearing, or family and friends think you cannot hear them.  If you need a hearing aid, be sure it fits well and wear it.  Get enough rest.  Ensure about 7-9 hours of sleep every day.  Get up slowly from your bed or chairs.  Do not start walking until you are sure you feel steady.  Wear non-skid, rubber-soled, low-heeled shoes.  Do not walk in socks, or in shoes and slippers with smooth soles.  If your PCP or therapist recommends using a cane or walker, use it regularly.  Make your home safer.  Increase lighting throughout the house, especially at the top and bottom of stairs.  Ensure lighting is easily turned on when getting up in the middle of the night.  Make sure there are two secure rails on all stairs.  Install grab bars in the bathtub / shower and near the toilet.  Consider using a shower chair and / or a hand-held shower.  Spread sand or salt on icy surfaces.  Beware of wet surfaces, which can be icy.  Tell your PCP if you have fallen.

## 2025-03-21 NOTE — PROGRESS NOTES
Subjective     Corey Leon is a 65 y.o. male who presents for an initial annual wellness visit.     Patient Care Team:  Dean Steele DO as PCP - General (Family Medicine)  Kolton Church MD as Cardiologist (Cardiology)  Randolph Stock MD as Surgeon (General Surgery)  Danilo Harris MD as Surgeon (General Surgery)    Comprehensive Medical and Social History  Patient Active Problem List   Diagnosis    Bilateral tinnitus    Conductive hearing loss    Chest discomfort    Coronary artery disease    Mixed hyperlipidemia    Family history of coronary artery disease    Wheeze    Hemorrhoids    COVID-19    Cough    Cardiac arrhythmia    Exercise-induced tachycardia    Fatty liver    Gastroenteritis    Reducible right inguinal hernia    Reducible umbilical hernia    Preop cardiovascular exam    Agatston CAC score 100-199    Encounter for surgical aftercare following surgery of digestive system    Pure hypercholesterolemia    Obesity    Lipid disorder     Past Medical History:   Diagnosis Date    Coronary artery disease     coronary CTA 6/23/22:  Multivessel coronary artery disease that is mild to moderate in severity and likely nonobstructive; CACS 126    COVID-19     2021, 2022    Exercise-induced tachycardia     pt states resolved    Fatty liver     Gastroenteritis      Past Surgical History   Procedure Laterality Date    Colonoscopy      Hand surgery Right     50 years ago    HERNIA UMBILICAL REPAIR  ROBOTIC, LAPAROSCOPY N/A 5/20/2024    Performed by Randolph Stock MD at  [facility]    Knee arthroscopy Right 2022    meniscal and tendon repair    Umbilical hernia repair  05/20/2024    [facility] tooth extraction       No Known Allergies  Current Outpatient Medications   Medication Sig Dispense Refill    coenzyme Q10 (COQ10) 100 mg capsule Take 100 mg by mouth every morning.      MAGNESIUM CHLORIDE ORAL Take 1,400 mg by mouth every morning.      multivitamin tablet Take 1 tablet by mouth every morning.       NON FORMULARY MEDICATION REQUEST Omega 3/fish oil 1000mg 1 capsule po BID      NON FORMULARY MEDICATION REQUEST Take 12 mg by mouth every evening. Astaxanthin 1 tablet po each evening      NON FORMULARY MEDICATION REQUEST Trianex 0.05% apply to skin daily prn      NON FORMULARY MEDICATION REQUEST Cholestoff plus 1 tab po BID  900mg      rosuvastatin (CRESTOR) 5 mg tablet Take 1 tablet (5 mg total) by mouth every evening. 90 tablet 3    selenium 200 mcg capsule Take 200 mcg by mouth every evening.      valACYclovir (VALTREX) 1 gram tablet Take 1 tablet (1,000 mg total) by mouth 3 (three) times a day for 5 days. 15 tablet 0     No current facility-administered medications for this visit.     Social History     Tobacco Use    Smoking status: Never     Passive exposure: Never    Smokeless tobacco: Never   Vaping Use    Vaping status: Never Used   Substance Use Topics    Alcohol use: Yes     Comment: 2 drinks weekly    Drug use: Never     Family History   Problem Relation Name Age of Onset    Lung disease Biological Mother      Heart disease Biological Father          CABG in his 50s;  at age 70    COPD Biological Father      Heart disease Biological Brother Christiano         CABG at age 58    Hyperlipidemia Biological Brother Christiano     Hypertension Biological Brother Christiano     No Known Problems Biological Sister Clementine     Cancer Maternal Grandmother           in her mid 60s    Heart attack Maternal Grandfather          multiple heart attacks starting at age 50    Heart disease Maternal Grandfather          CHF -  in his 70s    No Known Problems Biological Brother Markus     Hypertension Biological Brother Jose     Diabetes Biological Brother Jose     Autoimmune disease Biological Sister Sherine     Crohn's disease Biological Sister Sherine     Autoimmune disease Biological Brother Jose Alberto     No Known Problems Biological Sister Nitza        Objective   Vitals  Vitals:    25 0746   BP: 130/72   BP  Location: Left upper arm   Patient Position: Sitting   Pulse: 78   Resp: 18   Temp: 36.2 °C (97.2 °F)   TempSrc: Temporal   SpO2: 98%   Weight: 97.5 kg (215 lb)   Height: 1.829 m (6')     Body mass index is 29.16 kg/m².    Advanced Care Plan  Does patient have advance directive?: No   Patient does not have Advance Directive: [facility] Advance Care Planning brochure provided       Does patient have current OOH DNR form?: No   Patient does not have current OOH DNR form: Patient/Family declines further information       Does patient have current POLST?: No   Patient does not have current POLST: Patient/Family declines further information         PHQ  Will the patient answer the depression questions?: Yes   Little interest or pleasure in doing things: Not at all   Feeling down, depressed, or hopeless: Not at all   Depression Risk: 0                                             Mini Cog  Completed: Yes  Score: 5  Result: Negative      Get Up and Go  Result: Pass    STEADI Falls Risk  One or more falls in the last year: No           Has trouble stepping up onto a curb: No   Advised to use a cane or walker to get around safely: No   Often has to rush to the toilet: No   Feels unsteady when walking: No   Has lost some feeling in feet: No   Often feels sad or depressed: No   Steadies self on furniture while walking at home: No   Takes medication that makes him/her feel lightheaded or more tired than usual: No   Worried about falling: No   Takes medicine to sleep or improve mood: No   Needs to push with hands when rising from a chair: No   Falls screen completed: Yes     Hearing and Vision Screening  Vision Screening    Right eye Left eye Both eyes   Without correction      With correction 20/20 20/20 20/20   Hearing Screening - Comments:: Whisper test passed  See HRA for relevant hearing screening response.    Diet and Exercise  Do you exercise regularly?: Yes   Do you feel that your diet is healthy?: Yes     Assessment/Plan   For  mwe  No new issues, c/o's, changes  stable on current rx, dx, tx  Stable on rx, c/w same  Check labs  Diet, ex, safety reviewed   Needs cardio change  See sheet, reviewed  Mwe protocol  Ex, wgt loss, mvi's reviewed, discussed, counseled at length    See Patient Instructions (the written plan) which was given to the patient for PPPS and health risk factors with interventions.

## 2025-03-28 ENCOUNTER — APPOINTMENT (OUTPATIENT)
Dept: LAB | Age: 65
End: 2025-03-28
Attending: FAMILY MEDICINE
Payer: MEDICARE

## 2025-03-28 DIAGNOSIS — E78.2 MIXED HYPERLIPIDEMIA: ICD-10-CM

## 2025-03-28 DIAGNOSIS — I25.85 CHRONIC CORONARY MICROVASCULAR DYSFUNCTION: ICD-10-CM

## 2025-03-28 DIAGNOSIS — R93.1 AGATSTON CAC SCORE 100-199: ICD-10-CM

## 2025-03-28 DIAGNOSIS — K76.0 FATTY LIVER: ICD-10-CM

## 2025-03-28 DIAGNOSIS — N40.0 BENIGN PROSTATIC HYPERPLASIA, UNSPECIFIED WHETHER LOWER URINARY TRACT SYMPTOMS PRESENT: ICD-10-CM

## 2025-03-28 LAB
ALBUMIN SERPL-MCNC: 4.5 G/DL (ref 3.5–5.7)
ALP SERPL-CCNC: 56 IU/L (ref 34–125)
ALT SERPL-CCNC: 30 IU/L (ref 7–52)
ANION GAP SERPL CALC-SCNC: 7 MEQ/L (ref 3–15)
AST SERPL-CCNC: 22 IU/L (ref 13–39)
BASOPHILS # BLD: 0.03 K/UL (ref 0.01–0.1)
BASOPHILS NFR BLD: 0.7 %
BILIRUB SERPL-MCNC: 0.5 MG/DL (ref 0.3–1.2)
BUN SERPL-MCNC: 22 MG/DL (ref 7–25)
CALCIUM SERPL-MCNC: 9.2 MG/DL (ref 8.6–10.3)
CHLORIDE SERPL-SCNC: 108 MEQ/L (ref 98–107)
CHOLEST SERPL-MCNC: 159 MG/DL
CO2 SERPL-SCNC: 27 MEQ/L (ref 21–31)
CREAT SERPL-MCNC: 0.9 MG/DL (ref 0.7–1.3)
CRP SERPL-MCNC: 1.4 MG/L
DIFFERENTIAL METHOD BLD: NORMAL
EGFRCR SERPLBLD CKD-EPI 2021: >60 ML/MIN/1.73M*2
EOSINOPHIL # BLD: 0.11 K/UL (ref 0.04–0.54)
EOSINOPHIL NFR BLD: 2.4 %
ERYTHROCYTE [DISTWIDTH] IN BLOOD BY AUTOMATED COUNT: 14.1 % (ref 11.6–14.4)
GLUCOSE SERPL-MCNC: 94 MG/DL (ref 70–99)
HCT VFR BLD AUTO: 47.2 % (ref 40.1–51)
HDLC SERPL-MCNC: 44 MG/DL
HDLC SERPL: 3.6 {RATIO}
HGB BLD-MCNC: 15.3 G/DL (ref 13.7–17.5)
IMM GRANULOCYTES # BLD AUTO: 0.01 K/UL (ref 0–0.08)
IMM GRANULOCYTES NFR BLD AUTO: 0.2 %
LDLC SERPL CALC-MCNC: 101 MG/DL
LYMPHOCYTES # BLD: 1.46 K/UL (ref 1.2–3.5)
LYMPHOCYTES NFR BLD: 32.1 %
MCH RBC QN AUTO: 31.1 PG (ref 28–33.2)
MCHC RBC AUTO-ENTMCNC: 32.4 G/DL (ref 32.2–36.5)
MCV RBC AUTO: 95.9 FL (ref 83–98)
MONOCYTES # BLD: 0.51 K/UL (ref 0.3–1)
MONOCYTES NFR BLD: 11.2 %
NEUTROPHILS # BLD: 2.43 K/UL (ref 1.7–7)
NEUTS SEG NFR BLD: 53.4 %
NONHDLC SERPL-MCNC: 115 MG/DL
NRBC BLD-RTO: 0 %
PLATELET # BLD AUTO: 203 K/UL (ref 150–350)
PMV BLD AUTO: 10.6 FL (ref 9.4–12.4)
POTASSIUM SERPL-SCNC: 4.7 MEQ/L (ref 3.5–5.1)
PROT SERPL-MCNC: 6.9 G/DL (ref 6–8.2)
PSA SERPL-MCNC: 1.04 NG/ML
RBC # BLD AUTO: 4.92 M/UL (ref 4.5–5.8)
SODIUM SERPL-SCNC: 142 MEQ/L (ref 136–145)
TRIGL SERPL-MCNC: 70 MG/DL
WBC # BLD AUTO: 4.55 K/UL (ref 3.8–10.5)

## 2025-03-28 PROCEDURE — 36415 COLL VENOUS BLD VENIPUNCTURE: CPT

## 2025-03-28 PROCEDURE — 85025 COMPLETE CBC W/AUTO DIFF WBC: CPT

## 2025-03-28 PROCEDURE — 80053 COMPREHEN METABOLIC PANEL: CPT

## 2025-03-28 PROCEDURE — 84153 ASSAY OF PSA TOTAL: CPT

## 2025-03-28 PROCEDURE — 82172 ASSAY OF APOLIPOPROTEIN: CPT

## 2025-03-28 PROCEDURE — 80061 LIPID PANEL: CPT

## 2025-03-28 PROCEDURE — 86140 C-REACTIVE PROTEIN: CPT

## 2025-03-28 PROCEDURE — 83698 ASSAY LIPOPROTEIN PLA2: CPT

## 2025-03-28 PROCEDURE — 83695 ASSAY OF LIPOPROTEIN(A): CPT

## 2025-03-30 LAB — APO B SERPL-MCNC: 92 MG/DL

## 2025-03-31 LAB — LPA SERPL-SCNC: <10 NMOL/L

## 2025-04-15 ENCOUNTER — OFFICE VISIT (OUTPATIENT)
Dept: PRIMARY CARE | Facility: CLINIC | Age: 65
End: 2025-04-15
Payer: MEDICARE

## 2025-04-15 VITALS
HEART RATE: 77 BPM | TEMPERATURE: 98.2 F | DIASTOLIC BLOOD PRESSURE: 90 MMHG | SYSTOLIC BLOOD PRESSURE: 140 MMHG | BODY MASS INDEX: 29.12 KG/M2 | HEIGHT: 72 IN | WEIGHT: 215 LBS | OXYGEN SATURATION: 99 %

## 2025-04-15 DIAGNOSIS — J01.11 ACUTE RECURRENT FRONTAL SINUSITIS: Primary | ICD-10-CM

## 2025-04-15 LAB
APO B SERPL-MCNC: 86 MG/DL
CHOLEST SERPL-MCNC: 160 MG/DL
CHOLEST/HDLC SERPL: 3.3 CALC
HDL ALPHA 3 SER-SCNC: 362 NMOL/L
HDLC SERPL-MCNC: 49 MG/DL
HLD.LARGE SERPL-SCNC: 4052 NMOL/L
LDL SERPL QN: 213.6 ANGSTROM
LDL SERPL-SCNC: 1805 NMOL/L
LDL SMALL SERPL-SCNC: 423 NMOL/L
LDLC REAL SIZE PAT SERPL: ABNORMAL PATTERN
LDLC SERPL CALC-MCNC: 96 MG/DL (CALC)
LPA SERPL-SCNC: <10 NMOL/L
NONHDLC SERPL-MCNC: 111 MG/DL (CALC)
TRIGL SERPL-MCNC: 67 MG/DL

## 2025-04-15 PROCEDURE — 99213 OFFICE O/P EST LOW 20 MIN: CPT | Performed by: FAMILY MEDICINE

## 2025-04-15 RX ORDER — AZITHROMYCIN 250 MG/1
TABLET, FILM COATED ORAL
Qty: 6 TABLET | Refills: 0 | Status: SHIPPED | OUTPATIENT
Start: 2025-04-15 | End: 2025-04-17 | Stop reason: SDUPTHER

## 2025-04-15 ASSESSMENT — ENCOUNTER SYMPTOMS
SINUS PRESSURE: 1
NERVOUS/ANXIOUS: 0
NECK PAIN: 0
ARTHRALGIAS: 0
ABDOMINAL PAIN: 0
HEADACHES: 0
CHEST TIGHTNESS: 0
DYSURIA: 0
WEAKNESS: 0
FREQUENCY: 0
BACK PAIN: 0
TROUBLE SWALLOWING: 0
SORE THROAT: 1
SHORTNESS OF BREATH: 0
ACTIVITY CHANGE: 0
SINUS PAIN: 1
PALPITATIONS: 0
DIARRHEA: 0
COUGH: 1
FATIGUE: 0
EYE PAIN: 0
BLOOD IN STOOL: 0
NAUSEA: 0

## 2025-04-16 NOTE — PROGRESS NOTES
Daily Progress Note      Subjective      Patient ID: Chano Leon is a 65 y.o. male.    HPI  Siunsitis sx x 3-4 d  Cough, congestion, opnd    The following have been reviewed and updated as appropriate in this visit:        Review of Systems   Constitutional:  Negative for activity change and fatigue.   HENT:  Positive for postnasal drip, sinus pressure, sinus pain and sore throat. Negative for congestion, ear pain, tinnitus and trouble swallowing.    Eyes:  Negative for pain.   Respiratory:  Positive for cough. Negative for chest tightness and shortness of breath.    Cardiovascular:  Negative for chest pain and palpitations.   Gastrointestinal:  Negative for abdominal pain, blood in stool, diarrhea and nausea.   Genitourinary:  Negative for dysuria, frequency and urgency.   Musculoskeletal:  Negative for arthralgias, back pain and neck pain.   Neurological:  Negative for weakness and headaches.   Psychiatric/Behavioral:  The patient is not nervous/anxious.    All other systems reviewed and are negative.      Current Outpatient Medications   Medication Sig Dispense Refill    azithromycin (ZITHROMAX Z-RAYMON) 250 mg tablet 500mg once for 1 day, then 250mg once daily for 4 days 6 tablet 0    coenzyme Q10 (COQ10) 100 mg capsule Take 100 mg by mouth every morning.      MAGNESIUM CHLORIDE ORAL Take 1,400 mg by mouth every morning.      multivitamin tablet Take 1 tablet by mouth every morning.      rosuvastatin (CRESTOR) 5 mg tablet Take 1 tablet (5 mg total) by mouth every evening. 90 tablet 3    selenium 200 mcg capsule Take 200 mcg by mouth every evening.      valACYclovir (VALTREX) 1 gram tablet Take 1 tablet (1,000 mg total) by mouth 3 (three) times a day for 5 days. 15 tablet 0     No current facility-administered medications for this visit.     Past Medical History:   Diagnosis Date    Coronary artery disease     coronary CTA 6/23/22:  Multivessel coronary artery disease that is mild to moderate in severity  and likely nonobstructive; CACS 126    COVID-19     ,     Exercise-induced tachycardia     pt states resolved    Fatty liver     Gastroenteritis      Family History   Problem Relation Name Age of Onset    Lung disease Biological Mother      Heart disease Biological Father          CABG in his 50s;  at age 70    COPD Biological Father      Heart disease Biological Brother Christiano         CABG at age 58    Hyperlipidemia Biological Brother Christiano     Hypertension Biological Brother Christiano     No Known Problems Biological Sister Clementine     Cancer Maternal Grandmother           in her mid 60s    Heart attack Maternal Grandfather          multiple heart attacks starting at age 50    Heart disease Maternal Grandfather          CHF -  in his 70s    No Known Problems Biological Brother Markus     Hypertension Biological Brother Jose     Diabetes Biological Brother Jose     Autoimmune disease Biological Sister Sherine     Crohn's disease Biological Sister Sherine     Autoimmune disease Biological Brother Jose Alberto     No Known Problems Biological Sister Nitza      Past Surgical History   Procedure Laterality Date    Colonoscopy      Hand surgery Right     50 years ago    HERNIA UMBILICAL REPAIR  ROBOTIC, LAPAROSCOPY N/A 2024    Performed by Randolph Stock MD at  OR [facility]    Knee arthroscopy Right     meniscal and tendon repair    Umbilical hernia repair  2024    [place] tooth extraction       Social History     Socioeconomic History    Marital status:      Spouse name: Not on file    Number of children: Not on file    Years of education: Not on file    Highest education level: Not on file   Occupational History    Not on file   Tobacco Use    Smoking status: Never     Passive exposure: Never    Smokeless tobacco: Never   Vaping Use    Vaping status: Never Used   Substance and Sexual Activity    Alcohol use: Yes     Comment: 2 drinks weekly    Drug use: Never    Sexual activity: Yes      Partners: Female     Birth control/protection: None   Other Topics Concern    Not on file   Social History Narrative    Not on file     Social Drivers of Health     Financial Resource Strain: Not on file   Food Insecurity: No Food Insecurity (3/12/2022)    Hunger Vital Sign     Worried About Running Out of Food in the Last Year: Never true     Ran Out of Food in the Last Year: Never true   Transportation Needs: Not on file   Physical Activity: Not on file   Stress: Not on file   Social Connections: Not on file   Intimate Partner Violence: Not on file   Housing Stability: Not on file     No Known Allergies    Objective   No new labs.    Physical Exam  Constitutional:       General: He is not in acute distress.     Appearance: Normal appearance. He is well-developed. He is not ill-appearing.   HENT:      Head: Normocephalic and atraumatic.      Right Ear: Tympanic membrane and external ear normal.      Left Ear: Tympanic membrane and external ear normal.      Mouth/Throat:      Mouth: Mucous membranes are moist.   Eyes:      Pupils: Pupils are equal, round, and reactive to light.   Neck:      Thyroid: No thyromegaly.   Cardiovascular:      Rate and Rhythm: Normal rate and regular rhythm.      Heart sounds: Normal heart sounds.   Pulmonary:      Effort: Pulmonary effort is normal. No respiratory distress.      Breath sounds: Normal breath sounds. No wheezing or rales.   Abdominal:      General: Bowel sounds are normal.      Palpations: Abdomen is soft. There is no mass.      Tenderness: There is no abdominal tenderness. There is no guarding or rebound.      Hernia: No hernia is present.   Musculoskeletal:         General: No deformity. Normal range of motion.      Cervical back: Normal range of motion and neck supple.   Skin:     General: Skin is warm and dry.   Neurological:      Mental Status: He is alert and oriented to person, place, and time.      Cranial Nerves: No cranial nerve deficit.   Psychiatric:          Behavior: Behavior normal.         Thought Content: Thought content normal.         Judgment: Judgment normal.         Assessment/Plan     Problem List Items Addressed This Visit    None    No orders of the defined types were placed in this encounter.    Edd Tabor pred  'will uri protocol  Discussed labs    Dean Steele DO  4/15/2025

## 2025-04-17 ENCOUNTER — TELEPHONE (OUTPATIENT)
Dept: PRIMARY CARE | Facility: CLINIC | Age: 65
End: 2025-04-17
Payer: MEDICARE

## 2025-04-17 RX ORDER — AZITHROMYCIN 250 MG/1
TABLET, FILM COATED ORAL
Qty: 6 TABLET | Refills: 0 | Status: SHIPPED | OUTPATIENT
Start: 2025-04-17 | End: 2025-04-22

## 2025-04-17 NOTE — TELEPHONE ENCOUNTER
Patient wants his chart noted that a z-jamey has never worked for him.  He will give this round a try.   He will contact the pharmacy and see how much this is to fill, since he just had this filled Tuesday and he may call back and ask for a different antibiotic.

## (undated) DEVICE — SYRINGE 10CC CONTROL LL

## (undated) DEVICE — GLOVES 7.5 GAMMEX NON LATEX/SENS BEADED

## (undated) DEVICE — BINDER ABDOMINAL M/L 9 HIGH 46-62

## (undated) DEVICE — GOWN SIRUS NONRNF RAGLAN XL ST 30/CS

## (undated) DEVICE — SEAL UNIVERSAL 5MM-8MM XI

## (undated) DEVICE — STRAP POSITIONING 5X72" ONE PIECE DISP

## (undated) DEVICE — APPLICATOR CHLORAPREP 26ML ORANGE TINT

## (undated) DEVICE — NEEDLE  HYPODERMIC NEEDLE-PRO EDGE SAFETY DEVICE 22G X 1 1/

## (undated) DEVICE — GOWN SIRUS NONRNF RAGLAN 2XL ST 28/CS

## (undated) DEVICE — SUTURE VLOC 2-0 90 UNDYED 1X9 GS-22

## (undated) DEVICE — PAD GROUND ELECTROSURGICAL W/CORD

## (undated) DEVICE — SHEARS TIP COVER DAVINCI ONE USE

## (undated) DEVICE — SYRINGE DISP LUER-LOK 10 CC

## (undated) DEVICE — SUTURE VLOC 3-0 180 GREEN 1X9 V-20

## (undated) DEVICE — MANIFOLD FOUR PORT NEPTUNE

## (undated) DEVICE — SUTURE BIOSYN 4-0 UNDYED 1X18 P-14

## (undated) DEVICE — SOLN IRRIG .9%SOD 1000ML

## (undated) DEVICE — NEEDLE/VERRES 120MM    PN120

## (undated) DEVICE — SHEET DRAPE 40X58 STERILE

## (undated) DEVICE — BLADE PROTECTED SIZE 11

## (undated) DEVICE — XI OPTICAL BLADLESS OBTURATOR 8MM

## (undated) DEVICE — SET UP PACK RH

## (undated) DEVICE — TUBE INSUFFLATION OPEN CONNECTION

## (undated) DEVICE — ADHESIVE SKIN DERMABOND ADVANCED 0.7ML

## (undated) DEVICE — TRAY URINE METER FOLEY NON-SILVER

## (undated) DEVICE — TUBING PLUM PORT ACTIVE SMOKE EVAC

## (undated) DEVICE — DRAPE ARM DAVINCI XI

## (undated) DEVICE — SYSTEM VISUALIZATION CLEARIFY

## (undated) DEVICE — PAD POSITIONING XL

## (undated) DEVICE — HANDLE LIGHT COVER STERILE